# Patient Record
Sex: FEMALE | Race: BLACK OR AFRICAN AMERICAN | NOT HISPANIC OR LATINO | Employment: PART TIME | URBAN - NONMETROPOLITAN AREA
[De-identification: names, ages, dates, MRNs, and addresses within clinical notes are randomized per-mention and may not be internally consistent; named-entity substitution may affect disease eponyms.]

---

## 2023-07-11 LAB — CRC RECOMMENDATION EXT: NORMAL

## 2023-10-13 ENCOUNTER — OFFICE VISIT (OUTPATIENT)
Dept: FAMILY MEDICINE | Facility: CLINIC | Age: 48
End: 2023-10-13
Payer: COMMERCIAL

## 2023-10-13 VITALS
TEMPERATURE: 98 F | SYSTOLIC BLOOD PRESSURE: 139 MMHG | BODY MASS INDEX: 36.54 KG/M2 | OXYGEN SATURATION: 95 % | WEIGHT: 214 LBS | HEART RATE: 84 BPM | HEIGHT: 64 IN | DIASTOLIC BLOOD PRESSURE: 90 MMHG

## 2023-10-13 DIAGNOSIS — F32.A ANXIETY AND DEPRESSION: ICD-10-CM

## 2023-10-13 DIAGNOSIS — D50.8 IRON DEFICIENCY ANEMIA SECONDARY TO INADEQUATE DIETARY IRON INTAKE: ICD-10-CM

## 2023-10-13 DIAGNOSIS — F31.32 BIPOLAR AFFECTIVE DISORDER, CURRENTLY DEPRESSED, MODERATE: Chronic | ICD-10-CM

## 2023-10-13 DIAGNOSIS — G89.29 CHRONIC LEFT-SIDED LOW BACK PAIN WITH LEFT-SIDED SCIATICA: ICD-10-CM

## 2023-10-13 DIAGNOSIS — Z13.1 SCREENING FOR DIABETES MELLITUS (DM): ICD-10-CM

## 2023-10-13 DIAGNOSIS — J01.00 SUBACUTE MAXILLARY SINUSITIS: ICD-10-CM

## 2023-10-13 DIAGNOSIS — M54.42 CHRONIC LEFT-SIDED LOW BACK PAIN WITH LEFT-SIDED SCIATICA: ICD-10-CM

## 2023-10-13 DIAGNOSIS — L02.439 CARBUNCLE OF AXILLA: ICD-10-CM

## 2023-10-13 DIAGNOSIS — I10 PRIMARY HYPERTENSION: ICD-10-CM

## 2023-10-13 DIAGNOSIS — F41.9 ANXIETY AND DEPRESSION: ICD-10-CM

## 2023-10-13 DIAGNOSIS — E55.9 VITAMIN D DEFICIENCY: ICD-10-CM

## 2023-10-13 DIAGNOSIS — Z11.59 ENCOUNTER FOR HEPATITIS C SCREENING TEST FOR LOW RISK PATIENT: ICD-10-CM

## 2023-10-13 DIAGNOSIS — E78.2 MIXED HYPERLIPIDEMIA: Primary | ICD-10-CM

## 2023-10-13 DIAGNOSIS — M47.26 OSTEOARTHRITIS OF SPINE WITH RADICULOPATHY, LUMBAR REGION: ICD-10-CM

## 2023-10-13 PROBLEM — E78.5 HYPERLIPIDEMIA: Status: ACTIVE | Noted: 2023-10-13

## 2023-10-13 LAB
25(OH)D3 SERPL-MCNC: 82.5 NG/ML
ALBUMIN SERPL BCP-MCNC: 3.5 G/DL (ref 3.5–5)
ALBUMIN/GLOB SERPL: 0.9 {RATIO}
ALP SERPL-CCNC: 56 U/L (ref 41–108)
ALT SERPL W P-5'-P-CCNC: 18 U/L (ref 13–56)
ANION GAP SERPL CALCULATED.3IONS-SCNC: 11 MMOL/L (ref 7–16)
AST SERPL W P-5'-P-CCNC: 13 U/L (ref 15–37)
BASOPHILS # BLD AUTO: 0.14 K/UL (ref 0–0.2)
BASOPHILS NFR BLD AUTO: 1.9 % (ref 0–1)
BILIRUB SERPL-MCNC: 0.2 MG/DL (ref ?–1.2)
BILIRUB SERPL-MCNC: NEGATIVE MG/DL
BLOOD URINE, POC: ABNORMAL
BUN SERPL-MCNC: 7 MG/DL (ref 7–18)
BUN/CREAT SERPL: 8 (ref 6–20)
CALCIUM SERPL-MCNC: 9.1 MG/DL (ref 8.5–10.1)
CHLORIDE SERPL-SCNC: 111 MMOL/L (ref 98–107)
CLARITY, POC UA: CLEAR
CO2 SERPL-SCNC: 23 MMOL/L (ref 21–32)
COLOR, POC UA: YELLOW
CREAT SERPL-MCNC: 0.85 MG/DL (ref 0.55–1.02)
CREAT UR-MCNC: 293 MG/DL (ref 28–219)
DIFFERENTIAL METHOD BLD: ABNORMAL
EGFR (NO RACE VARIABLE) (RUSH/TITUS): 84 ML/MIN/1.73M2
EOSINOPHIL # BLD AUTO: 0.01 K/UL (ref 0–0.5)
EOSINOPHIL NFR BLD AUTO: 0.1 % (ref 1–4)
ERYTHROCYTE [DISTWIDTH] IN BLOOD BY AUTOMATED COUNT: 16 % (ref 11.5–14.5)
EST. AVERAGE GLUCOSE BLD GHB EST-MCNC: 107 MG/DL
GLOBULIN SER-MCNC: 4 G/DL (ref 2–4)
GLUCOSE SERPL-MCNC: 67 MG/DL (ref 74–106)
GLUCOSE UR QL STRIP: NEGATIVE
HBA1C MFR BLD HPLC: 5.8 % (ref 4.5–6.6)
HCT VFR BLD AUTO: 42.3 % (ref 38–47)
HCV AB SER QL: NORMAL
HGB BLD-MCNC: 13.3 G/DL (ref 12–16)
IMM GRANULOCYTES # BLD AUTO: 0.03 K/UL (ref 0–0.04)
IMM GRANULOCYTES NFR BLD: 0.4 % (ref 0–0.4)
IRON SATN MFR SERPL: 9 % (ref 14–50)
IRON SERPL-MCNC: 35 ΜG/DL (ref 50–170)
KETONES UR QL STRIP: NEGATIVE
LEUKOCYTE ESTERASE URINE, POC: NEGATIVE
LYMPHOCYTES # BLD AUTO: 1.51 K/UL (ref 1–4.8)
LYMPHOCYTES NFR BLD AUTO: 20.3 % (ref 27–41)
MCH RBC QN AUTO: 28.4 PG (ref 27–31)
MCHC RBC AUTO-ENTMCNC: 31.4 G/DL (ref 32–36)
MCV RBC AUTO: 90.4 FL (ref 80–96)
MICROALBUMIN UR-MCNC: 1.8 MG/DL (ref 0–2.8)
MICROALBUMIN/CREAT RATIO PNL UR: 6.1 MG/G (ref 0–30)
MONOCYTES # BLD AUTO: 0.41 K/UL (ref 0–0.8)
MONOCYTES NFR BLD AUTO: 5.5 % (ref 2–6)
MPC BLD CALC-MCNC: 11.3 FL (ref 9.4–12.4)
NEUTROPHILS # BLD AUTO: 5.35 K/UL (ref 1.8–7.7)
NEUTROPHILS NFR BLD AUTO: 71.8 % (ref 53–65)
NITRITE, POC UA: NEGATIVE
NRBC # BLD AUTO: 0 X10E3/UL
NRBC, AUTO (.00): 0 %
PH, POC UA: 5.5
PLATELET # BLD AUTO: 463 K/UL (ref 150–400)
POTASSIUM SERPL-SCNC: 3 MMOL/L (ref 3.5–5.1)
PROT SERPL-MCNC: 7.5 G/DL (ref 6.4–8.2)
PROTEIN, POC: NEGATIVE
RBC # BLD AUTO: 4.68 M/UL (ref 4.2–5.4)
SODIUM SERPL-SCNC: 142 MMOL/L (ref 136–145)
SPECIFIC GRAVITY, POC UA: 1.02
T4 FREE SERPL-MCNC: 1.18 NG/DL (ref 0.76–1.46)
TIBC SERPL-MCNC: 405 ΜG/DL (ref 250–450)
TSH SERPL DL<=0.005 MIU/L-ACNC: 0.84 UIU/ML (ref 0.36–3.74)
UROBILINOGEN, POC UA: 0.2
WBC # BLD AUTO: 7.45 K/UL (ref 4.5–11)

## 2023-10-13 PROCEDURE — 80053 COMPREHEN METABOLIC PANEL: CPT | Mod: ,,, | Performed by: CLINICAL MEDICAL LABORATORY

## 2023-10-13 PROCEDURE — 82570 ASSAY OF URINE CREATININE: CPT | Mod: ,,, | Performed by: CLINICAL MEDICAL LABORATORY

## 2023-10-13 PROCEDURE — 85025 COMPLETE CBC W/AUTO DIFF WBC: CPT | Mod: ,,, | Performed by: CLINICAL MEDICAL LABORATORY

## 2023-10-13 PROCEDURE — 82043 MICROALBUMIN / CREATININE RATIO URINE: ICD-10-PCS | Mod: ,,, | Performed by: CLINICAL MEDICAL LABORATORY

## 2023-10-13 PROCEDURE — 4010F PR ACE/ARB THEARPY RXD/TAKEN: ICD-10-PCS | Mod: ,,, | Performed by: NURSE PRACTITIONER

## 2023-10-13 PROCEDURE — 84443 TSH: ICD-10-PCS | Mod: ,,, | Performed by: CLINICAL MEDICAL LABORATORY

## 2023-10-13 PROCEDURE — G0008 ADMIN INFLUENZA VIRUS VAC: HCPCS | Mod: ,,, | Performed by: NURSE PRACTITIONER

## 2023-10-13 PROCEDURE — 86803 HEPATITIS C ANTIBODY: ICD-10-PCS | Mod: ,,, | Performed by: CLINICAL MEDICAL LABORATORY

## 2023-10-13 PROCEDURE — 85025 CBC WITH DIFFERENTIAL: ICD-10-PCS | Mod: ,,, | Performed by: CLINICAL MEDICAL LABORATORY

## 2023-10-13 PROCEDURE — 81002 POCT URINE DIPSTICK WITHOUT MICROSCOPE: ICD-10-PCS | Mod: ,,, | Performed by: NURSE PRACTITIONER

## 2023-10-13 PROCEDURE — 83550 IRON BINDING TEST: CPT | Mod: ,,, | Performed by: CLINICAL MEDICAL LABORATORY

## 2023-10-13 PROCEDURE — 84439 ASSAY OF FREE THYROXINE: CPT | Mod: ,,, | Performed by: CLINICAL MEDICAL LABORATORY

## 2023-10-13 PROCEDURE — 99204 OFFICE O/P NEW MOD 45 MIN: CPT | Mod: ,,, | Performed by: NURSE PRACTITIONER

## 2023-10-13 PROCEDURE — 99204 PR OFFICE/OUTPT VISIT, NEW, LEVL IV, 45-59 MIN: ICD-10-PCS | Mod: ,,, | Performed by: NURSE PRACTITIONER

## 2023-10-13 PROCEDURE — 4010F ACE/ARB THERAPY RXD/TAKEN: CPT | Mod: ,,, | Performed by: NURSE PRACTITIONER

## 2023-10-13 PROCEDURE — 82306 VITAMIN D 25 HYDROXY: CPT | Mod: ,,, | Performed by: CLINICAL MEDICAL LABORATORY

## 2023-10-13 PROCEDURE — 3008F BODY MASS INDEX DOCD: CPT | Mod: ,,, | Performed by: NURSE PRACTITIONER

## 2023-10-13 PROCEDURE — 82043 UR ALBUMIN QUANTITATIVE: CPT | Mod: ,,, | Performed by: CLINICAL MEDICAL LABORATORY

## 2023-10-13 PROCEDURE — 84439 T4, FREE: ICD-10-PCS | Mod: ,,, | Performed by: CLINICAL MEDICAL LABORATORY

## 2023-10-13 PROCEDURE — 90686 IIV4 VACC NO PRSV 0.5 ML IM: CPT | Mod: ,,, | Performed by: NURSE PRACTITIONER

## 2023-10-13 PROCEDURE — 83036 HEMOGLOBIN A1C: ICD-10-PCS | Mod: GZ,,, | Performed by: CLINICAL MEDICAL LABORATORY

## 2023-10-13 PROCEDURE — 3075F SYST BP GE 130 - 139MM HG: CPT | Mod: ,,, | Performed by: NURSE PRACTITIONER

## 2023-10-13 PROCEDURE — 82570 MICROALBUMIN / CREATININE RATIO URINE: ICD-10-PCS | Mod: ,,, | Performed by: CLINICAL MEDICAL LABORATORY

## 2023-10-13 PROCEDURE — 83540 IRON AND TIBC: ICD-10-PCS | Mod: ,,, | Performed by: CLINICAL MEDICAL LABORATORY

## 2023-10-13 PROCEDURE — 82306 VITAMIN D: ICD-10-PCS | Mod: ,,, | Performed by: CLINICAL MEDICAL LABORATORY

## 2023-10-13 PROCEDURE — 86803 HEPATITIS C AB TEST: CPT | Mod: ,,, | Performed by: CLINICAL MEDICAL LABORATORY

## 2023-10-13 PROCEDURE — 3080F DIAST BP >= 90 MM HG: CPT | Mod: ,,, | Performed by: NURSE PRACTITIONER

## 2023-10-13 PROCEDURE — 3008F PR BODY MASS INDEX (BMI) DOCUMENTED: ICD-10-PCS | Mod: ,,, | Performed by: NURSE PRACTITIONER

## 2023-10-13 PROCEDURE — 1159F PR MEDICATION LIST DOCUMENTED IN MEDICAL RECORD: ICD-10-PCS | Mod: ,,, | Performed by: NURSE PRACTITIONER

## 2023-10-13 PROCEDURE — 90686 FLU VACCINE (QUAD) GREATER THAN OR EQUAL TO 3YO PRESERVATIVE FREE IM: ICD-10-PCS | Mod: ,,, | Performed by: NURSE PRACTITIONER

## 2023-10-13 PROCEDURE — G0008 FLU VACCINE (QUAD) GREATER THAN OR EQUAL TO 3YO PRESERVATIVE FREE IM: ICD-10-PCS | Mod: ,,, | Performed by: NURSE PRACTITIONER

## 2023-10-13 PROCEDURE — 83550 IRON AND TIBC: ICD-10-PCS | Mod: ,,, | Performed by: CLINICAL MEDICAL LABORATORY

## 2023-10-13 PROCEDURE — 80053 COMPREHENSIVE METABOLIC PANEL: ICD-10-PCS | Mod: ,,, | Performed by: CLINICAL MEDICAL LABORATORY

## 2023-10-13 PROCEDURE — 83540 ASSAY OF IRON: CPT | Mod: ,,, | Performed by: CLINICAL MEDICAL LABORATORY

## 2023-10-13 PROCEDURE — 3080F PR MOST RECENT DIASTOLIC BLOOD PRESSURE >= 90 MM HG: ICD-10-PCS | Mod: ,,, | Performed by: NURSE PRACTITIONER

## 2023-10-13 PROCEDURE — 83036 HEMOGLOBIN GLYCOSYLATED A1C: CPT | Mod: GZ,,, | Performed by: CLINICAL MEDICAL LABORATORY

## 2023-10-13 PROCEDURE — 3075F PR MOST RECENT SYSTOLIC BLOOD PRESS GE 130-139MM HG: ICD-10-PCS | Mod: ,,, | Performed by: NURSE PRACTITIONER

## 2023-10-13 PROCEDURE — 84443 ASSAY THYROID STIM HORMONE: CPT | Mod: ,,, | Performed by: CLINICAL MEDICAL LABORATORY

## 2023-10-13 PROCEDURE — 81002 URINALYSIS NONAUTO W/O SCOPE: CPT | Mod: ,,, | Performed by: NURSE PRACTITIONER

## 2023-10-13 PROCEDURE — 1159F MED LIST DOCD IN RCRD: CPT | Mod: ,,, | Performed by: NURSE PRACTITIONER

## 2023-10-13 RX ORDER — ATORVASTATIN CALCIUM 10 MG/1
10 TABLET, FILM COATED ORAL NIGHTLY
COMMUNITY
Start: 2023-08-23 | End: 2024-01-17 | Stop reason: SDUPTHER

## 2023-10-13 RX ORDER — BUSPIRONE HYDROCHLORIDE 10 MG/1
10 TABLET ORAL 3 TIMES DAILY PRN
Qty: 90 TABLET | Refills: 0 | Status: SHIPPED | OUTPATIENT
Start: 2023-10-13 | End: 2024-01-18 | Stop reason: SINTOL

## 2023-10-13 RX ORDER — BUSPIRONE HYDROCHLORIDE 10 MG/1
10 TABLET ORAL DAILY
COMMUNITY
Start: 2023-02-16 | End: 2023-10-13 | Stop reason: SDUPTHER

## 2023-10-13 RX ORDER — SULFAMETHOXAZOLE AND TRIMETHOPRIM 800; 160 MG/1; MG/1
1 TABLET ORAL 2 TIMES DAILY
Qty: 20 TABLET | Refills: 0 | Status: SHIPPED | OUTPATIENT
Start: 2023-10-13 | End: 2023-10-23

## 2023-10-13 RX ORDER — ERGOCALCIFEROL 1.25 MG/1
50000 CAPSULE ORAL
COMMUNITY
Start: 2023-09-15 | End: 2024-01-18 | Stop reason: SDUPTHER

## 2023-10-13 RX ORDER — CLINDAMYCIN HYDROCHLORIDE 300 MG/1
300 CAPSULE ORAL EVERY 8 HOURS
Qty: 30 CAPSULE | Refills: 0 | Status: SHIPPED | OUTPATIENT
Start: 2023-10-13 | End: 2023-10-23

## 2023-10-13 RX ORDER — MUPIROCIN 20 MG/G
OINTMENT TOPICAL 3 TIMES DAILY
Qty: 22 G | Refills: 0 | Status: SHIPPED | OUTPATIENT
Start: 2023-10-13 | End: 2024-01-18 | Stop reason: ALTCHOICE

## 2023-10-13 RX ORDER — MELOXICAM 7.5 MG/1
7.5 TABLET ORAL ONCE AS NEEDED
COMMUNITY
Start: 2023-05-26 | End: 2024-01-18 | Stop reason: HOSPADM

## 2023-10-13 RX ORDER — QUETIAPINE FUMARATE 50 MG/1
50 TABLET, EXTENDED RELEASE ORAL NIGHTLY
Qty: 60 TABLET | Refills: 0 | Status: SHIPPED | OUTPATIENT
Start: 2023-10-13 | End: 2024-01-18 | Stop reason: SINTOL

## 2023-10-13 RX ORDER — FERROUS SULFATE 325(65) MG
325 TABLET ORAL 3 TIMES DAILY
COMMUNITY
Start: 2023-09-06 | End: 2024-01-17 | Stop reason: SDUPTHER

## 2023-10-13 RX ORDER — BACLOFEN 10 MG/1
10 TABLET ORAL 2 TIMES DAILY PRN
COMMUNITY
Start: 2023-06-15 | End: 2024-01-18

## 2023-10-13 RX ORDER — AMLODIPINE AND VALSARTAN 10; 320 MG/1; MG/1
1 TABLET ORAL DAILY
COMMUNITY
Start: 2023-08-23 | End: 2023-11-30 | Stop reason: SDUPTHER

## 2023-10-13 RX ORDER — IBUPROFEN 200 MG
200 TABLET ORAL EVERY 6 HOURS
COMMUNITY
Start: 2023-09-06 | End: 2024-01-18

## 2023-10-13 NOTE — ASSESSMENT & PLAN NOTE
Chronic problem  Continue atorvastatin 10 mg daily  Low cholesterol diet recommended  Increase physical activity daily and reduce inflammatory foods from diet as discussed at length in clinic  Fasting lab today

## 2023-10-13 NOTE — PATIENT INSTRUCTIONS
Begin Seroquel R 50 mg at night for one night then increase to two pills at night  Return for recheck in 3 weeks  Goal from medication: decrease anxiety, improve mood, increase desire to perform daily activities, improve sleep, reduce crying/tearful mood  Resume Buspirone 10 mg three times daily as needed for anxiety/anxious mood  Begin walking briskly for 5 minutes each day with goal up to 30 minutes per day  Increase water intake with goal of 1/2 body weight in ounces of water each day  Reduce/change Mountain Dew to Zero Sugar Mountain Dew and limit to one per day  Change diet to add color from vegetables and fruit/ nuts/ cheese/ healthy/lean means like chicken/fish/seafood/egg whites    For arm: warm salt water soak 5 minutes three round twice a day  Avoid squeezing or mashing area  Begin Bactrim DS twice a day for 10 days  Begin Clindamycin 300 mg three times daily for 10 days  Mupirocin ointment three times daily until healed    For sinus/ear pain/headache: Begin oral antibiotics  Begin Sudafed PE four times daily for 5 days   Add Mucinex 600 mg four times daily for 5 days  Blow nose often  Hold nose and blow then hold nose and swallow-- repeat three times four times daily to help move secretions from ear and reduce headache

## 2023-10-13 NOTE — PROGRESS NOTES
MARIMAR Diaz    [address] [place] [address]     PATIENT NAME: Yokasta Segal  : 1975  DATE: 10/13/23  MRN: [id number]      Billing Provider: MARIMAR Diaz  Level of Service: IA OFFICE/OUTPT VISIT, SVETLANA SANDERS IV, 45-59 MIN    ASSESSMENT AND PLAN:      Problem List Items Addressed This Visit          Neuro    Osteoarthritis of spine with radiculopathy, lumbar region       Psychiatric    Bipolar affective disorder, currently depressed, moderate (Chronic)    Current Assessment & Plan     Recurrent chronic acute problem  Resume Seroquel XR 50 mg tonight then increase to two pills nightly  Resume Buspirone 10 mg TID PRN anxiety           Relevant Medications    QUEtiapine (SEROQUEL XR) 50 mg Tb24    Other Relevant Orders    TSH    T4, Free       Cardiac/Vascular    Hyperlipidemia - Primary    Current Assessment & Plan     Chronic problem  Continue atorvastatin 10 mg daily  Low cholesterol diet recommended  Increase physical activity daily and reduce inflammatory foods from diet as discussed at length in clinic  Fasting lab today           Relevant Orders    Lipid Panel    Comprehensive Metabolic Panel    Primary hypertension    Current Assessment & Plan     Chronic problem  New provider today  Continue amlodipine-valsartan 10/320 mg daily  Low salt diet  Recommend reduce weight by 10 lbs to see change and lower blood pressure           Relevant Orders    CBC Auto Differential    Comprehensive Metabolic Panel    POCT urine dipstick without microscope (Completed)    Microalbumin/Creatinine Ratio, Urine       Oncology    Iron deficiency anemia secondary to inadequate dietary iron intake    Current Assessment & Plan     Chronic problem  Continue supplement  Recheck lab today  High rich iron diet recommended         Relevant Orders    Iron and TIBC       Endocrine    Vitamin D deficiency    Current Assessment & Plan     Chronic problem  Continue  supplement/replacement  Recheck lab results today with goal greater than 30         Relevant Orders    Vitamin D       Orthopedic    Chronic left-sided low back pain with left-sided sciatica    Overview     Dr. Min Dominguez at [facility]         Current Assessment & Plan     Chronic problem managed by Dr. Min Dominguez            Other Visit Diagnoses       Encounter for hepatitis C screening test for low risk patient        Relevant Orders    Hepatitis C Antibody    Screening for diabetes mellitus (DM)        Relevant Orders    Hemoglobin A1C    Anxiety and depression        Relevant Medications    busPIRone (BUSPAR) 10 MG tablet    Carbuncle of axilla        Relevant Medications    sulfamethoxazole-trimethoprim 800-160mg (BACTRIM DS) 800-160 mg Tab    clindamycin (CLEOCIN) 300 MG capsule    mupirocin (BACTROBAN) 2 % ointment    Subacute maxillary sinusitis        Relevant Medications    chlorpheniramine-phenylephrine 4-10 mg per tablet    sulfamethoxazole-trimethoprim 800-160mg (BACTRIM DS) 800-160 mg Tab            Health Maintenance Topics with due status: Not Due       Topic Last Completion Date    Mammogram 07/05/2023     Future Appointments   Date Time Provider Department Center   11/7/2023  8:00 AM Tatyana Lo, [facility]ULI[facility] Delacruz      Follow up in about 3 weeks (around 11/3/2023). or sooner as needed.      CHIEF COMPLAINT: Establish Care (Patient here to establish care), Anxiety (Patient state she has been dealing with a lot of stress and anxiety. She state she has alot going on that she will like to  discuss. She state she was on Wellbutrin and Seroquel. ), Depression (Think she's on Buspar but not quiet sure if that's the one she one. ), Headache (Patient state she having head pain for about 2 months. She was told it was bad sinus state she complete a round of amoxicillin and it left but came back. Describe it as somebody sticking a needle in her head. ), Cyst (Patient state she has  a cyst on left arm. Was prescribed bactrim and completed those and it's still there. ), Labs Only (She state he mother and father has cancer & she would like to be checked for cancer.), Health Maintenance (Will like to have a pap smear done. ), and Hypertension (Did not take BP medication this morning. )           HISTORY OF PRESENT ILLNESS:  Yokasta Segal is a 48 y.o. female who presents to the clinic today     Yokasta Segal presents to the clinic with Establish Care (Patient here to establish care), Anxiety (Patient state she has been dealing with a lot of stress and anxiety. She state she has alot going on that she will like to  discuss. She state she was on Wellbutrin and Seroquel. ), Depression (Think she's on Buspar but not quiet sure if that's the one she one. ), Headache (Patient state she having head pain for about 2 months. She was told it was bad sinus state she complete a round of amoxicillin and it left but came back. Describe it as somebody sticking a needle in her head. ), Cyst (Patient state she has a cyst on left arm. Was prescribed bactrim and completed those and it's still there. ), Labs Only (She state he mother and father has cancer & she would like to be checked for cancer.), Health Maintenance (Will like to have a pap smear done. ), and Hypertension (Did not take BP medication this morning. )     Anxiety  Presents for initial visit. Onset was 1 to 6 months ago. The problem has been gradually worsening. Symptoms include decreased concentration, depressed mood, dizziness, excessive worry, insomnia, irritability, malaise, muscle tension, nervous/anxious behavior and obsessions. Patient reports no suicidal ideas. Symptoms occur constantly. The severity of symptoms is causing significant distress. The symptoms are aggravated by family issues, medication and medication withdrawal. The patient sleeps 0 hours per night. The quality of sleep is non-restorative (reports retired for evening at 7:30 and  sleeps til 2 am then awake). Nighttime awakenings: then reports not sleeping but lying in bed awake.     Risk factors include a major life event and change in medication. Her past medical history is significant for anemia, bipolar disorder and depression. Past treatments include lifestyle changes, non-benzodiazephine anxiolytics, non-SSRI antidepressants and counseling (CBT). The treatment provided moderate relief. Compliance with prior treatments has been variable. Prior compliance problems include medication issues.   Depression  Visit Type: initial  Onset of symptoms: more than 5 years ago  Progression since onset: gradually worsening  Patient presents with the following symptoms: anhedonia, decreased concentration, depressed mood, excessive worry, fatigue, feelings of hopelessness, hypersomnia, insomnia, irritability, malaise, muscle tension, nervousness/anxiety, obsessions and thoughts of death.  Patient is not experiencing: suicidal ideas and suicidal planning.  Frequency of symptoms: constantly   Severity: causing significant distress   Aggravated by: family issues, medication withdrawal and caffeine  Sleep per night: 7 hours  Sleep quality: non-restorative  Nighttime awakenings: awakes around 2 am and awake the remainder of night.  Risk factors: previous episode of depression and change in medication  Patient has a history of: anemia, bipolar disorder and depression  Treatment tried: counseling (CBT), non-SSRI antidepressants, non-benzodiazephine anxiolytics and medications  Compliance with treatment: variable  Improvement on treatment: moderate    Headache   This is a recurrent problem. The current episode started in the past 7 days. The problem occurs intermittently. The problem has been waxing and waning. The pain is located in the Left unilateral region. The pain does not radiate. The pain quality is not similar to prior headaches. The quality of the pain is described as sharp, dull and aching. The pain  is at a severity of 5/10. The pain is moderate. Associated symptoms include dizziness, ear pain, insomnia, scalp tenderness and sinus pressure. The symptoms are aggravated by chewing and weather changes. She has tried acetaminophen and NSAIDs for the symptoms. The treatment provided no relief. Her past medical history is significant for hypertension, migraine headaches, obesity and sinus disease.   Cyst  This is a recurrent problem. The current episode started more than 1 month ago. The problem occurs rarely. The problem has been gradually improving. Associated symptoms include headaches. Nothing aggravates the symptoms. Treatments tried: took bactrim DS about three weeks ago but did not resolve.   Hypertension  This is a chronic problem. The current episode started more than 1 year ago. The problem has been waxing and waning since onset. The problem is controlled. Associated symptoms include anxiety, headaches and malaise/fatigue. There are no associated agents to hypertension. Risk factors for coronary artery disease include family history, obesity, sedentary lifestyle and stress. Past treatments include angiotensin blockers and calcium channel blockers. The current treatment provides significant improvement. Compliance problems include exercise and diet.        PAST MEDICAL HISTORY:      Past Medical History:   Diagnosis Date    Anxiety     Arthritis     Depression     Hyperlipidemia     Hypertension      PAST SURGICAL HISTORY:  Past Surgical History:   Procedure Laterality Date    Left Knee Surgery Left     TUBAL LIGATION       SOCIAL HISTORY:  Social History     Socioeconomic History    Marital status: Single   Tobacco Use    Smoking status: Never    Smokeless tobacco: Never   Substance and Sexual Activity    Alcohol use: Never    Drug use: Never    Sexual activity: Yes     Partners: Male     FAMILY HISTORY:       Family History   Problem Relation Age of Onset    Cancer Mother     Gout Mother     Thyroid  disease Mother     Cancer Father        ALLERGIES AND MEDICATIONS: updated and reviewed.       Review of patient's allergies indicates:  No Known Allergies  Medication List with Changes/Refills   New Medications    CHLORPHENIRAMINE-PHENYLEPHRINE 4-10 MG PER TABLET    Take 1 tablet by mouth every 4 (four) hours as needed for Congestion.    CLINDAMYCIN (CLEOCIN) 300 MG CAPSULE    Take 1 capsule (300 mg total) by mouth every 8 (eight) hours. for 10 days    MUPIROCIN (BACTROBAN) 2 % OINTMENT    Apply topically 3 (three) times daily.    QUETIAPINE (SEROQUEL XR) 50 MG TB24    Take 1 tablet (50 mg total) by mouth every evening. For one night then increase to two pills at bedtime    SULFAMETHOXAZOLE-TRIMETHOPRIM 800-160MG (BACTRIM DS) 800-160 MG TAB    Take 1 tablet by mouth 2 (two) times daily. for 10 days   Current Medications    AMLODIPINE-VALSARTAN (EXFORGE)  MG PER TABLET    Take 1 tablet by mouth once daily.    ATORVASTATIN (LIPITOR) 10 MG TABLET    Take 10 mg by mouth every evening.    BACLOFEN (LIORESAL) 10 MG TABLET    Take 10 mg by mouth 2 (two) times daily as needed.    ERGOCALCIFEROL (ERGOCALCIFEROL) 50,000 UNIT CAP    Take 50,000 Units by mouth every 7 days.    FERROUS SULFATE (FEOSOL) 325 MG (65 MG IRON) TAB TABLET    Take 325 mg by mouth 3 (three) times daily.    IBUPROFEN (ADVIL,MOTRIN) 200 MG TABLET    Take 200 mg by mouth every 6 (six) hours.    MELOXICAM (MOBIC) 7.5 MG TABLET    Take 7.5 mg by mouth once as needed.   Changed and/or Refilled Medications    Modified Medication Previous Medication    BUSPIRONE (BUSPAR) 10 MG TABLET busPIRone (BUSPAR) 10 MG tablet       Take 1 tablet (10 mg total) by mouth 3 (three) times daily as needed (anxiety).    Take 10 mg by mouth once daily.       SCREENING HISTORY:     Health Maintenance         Date Due Completion Date    Hepatitis C Screening Never done ---    Cervical Cancer Screening Never done ---    Lipid Panel Never done ---    COVID-19 Vaccine (1) Never  "done ---    Hemoglobin A1c (Diabetic Prevention Screening) Never done ---    Colorectal Cancer Screening Never done ---    Shingles Vaccine (1 of 2) Never done ---    HIV Screening 10/15/2023 (Originally 4/17/1990) ---    TETANUS VACCINE 10/15/2024 (Originally 4/17/1991) ---    Mammogram 07/05/2024 7/5/2023            REVIEW OF SYSTEMS:   Review of Systems   Constitutional:  Positive for irritability and malaise/fatigue.   HENT:  Positive for ear pain and sinus pressure/congestion.    Neurological:  Positive for dizziness and headaches.   Psychiatric/Behavioral:  Positive for decreased concentration and depression. Negative for suicidal ideas. The patient is nervous/anxious and has insomnia.          PHYSICAL EXAM:         BP (!) 139/90 (BP Location: Left arm, Patient Position: Sitting)   Pulse 84   Temp 98.2 °F (36.8 °C) (Oral)   Ht 5' 3.5" (1.613 m)   Wt 97.1 kg (214 lb)   LMP 10/08/2023 (Approximate)   SpO2 95%   BMI 37.31 kg/m²  Patient's last menstrual period was 10/08/2023 (approximate).  Wt Readings from Last 3 Encounters:   10/13/23 97.1 kg (214 lb)     BP Readings from Last 3 Encounters:   10/13/23 (!) 139/90     Estimated body mass index is 37.31 kg/m² as calculated from the following:    Height as of this encounter: 5' 3.5" (1.613 m).    Weight as of this encounter: 97.1 kg (214 lb).     Physical Exam  Constitutional:       Appearance: She is obese.   HENT:      Head: Normocephalic.      Right Ear: A middle ear effusion is present.      Left Ear: A middle ear effusion is present. Tympanic membrane is retracted and bulging.      Nose: Mucosal edema present.      Right Turbinates: Not swollen.      Left Turbinates: Swollen.      Left Sinus: Maxillary sinus tenderness present.      Mouth/Throat:      Lips: Pink.      Pharynx: Oropharynx is clear.   Cardiovascular:      Rate and Rhythm: Normal rate and regular rhythm.      Pulses: Normal pulses.   Pulmonary:      Effort: Pulmonary effort is normal.     " " Breath sounds: Normal breath sounds.   Abdominal:      General: Bowel sounds are normal.      Palpations: Abdomen is soft.   Musculoskeletal:      Cervical back: Neck supple.   Skin:     General: Skin is warm.   Neurological:      Mental Status: She is alert and oriented to person, place, and time.   Psychiatric:         Mood and Affect: Mood is depressed. Affect is labile, blunt, flat and tearful.         Speech: Speech normal.         Behavior: Behavior is cooperative.         Thought Content: Thought content is paranoid.         Cognition and Memory: Cognition normal.         LABS:     I have reviewed old labs below:  No results found for: "WBC", "HGB", "HCT", "MCV", "PLT", "NA", "K", "CL", "CALCIUM", "PHOS", "CO2", "GLU", "BUN", "CREATININE", "ANIONGAP", "ESTGFRAFRICA", "EGFRNONAA", "PROT", "ALBUMIN", "BILITOT", "ALKPHOS", "ALT", "AST", "INR", "CHOL", "TRIG", "HDL", "LDLCALC", "TSH", "PSA", "GLUF", "HGBA1C", "MICROALBUR"        Signature:  Tatyana Lo [address] [place] [address]  Phone #: [phone]  Fax #: [phone]       Date of encounter: 10/13/23    Patient Instructions   Begin Seroquel R 50 mg at night for one night then increase to two pills at night  Return for recheck in 3 weeks  Goal from medication: decrease anxiety, improve mood, increase desire to perform daily activities, improve sleep, reduce crying/tearful mood  Resume Buspirone 10 mg three times daily as needed for anxiety/anxious mood  Begin walking briskly for 5 minutes each day with goal up to 30 minutes per day  Increase water intake with goal of 1/2 body weight in ounces of water each day  Reduce/change Mountain Dew to Zero Sugar Mountain Dew and limit to one per day  Change diet to add color from vegetables and fruit/ nuts/ cheese/ healthy/lean means like chicken/fish/seafood/egg whites    For arm: warm salt water soak 5 minutes three round twice a day  Avoid squeezing or " mashing area  Begin Bactrim DS twice a day for 10 days  Begin Clindamycin 300 mg three times daily for 10 days  Mupirocin ointment three times daily until healed    For sinus/ear pain/headache: Begin oral antibiotics  Begin Sudafed PE four times daily for 5 days   Add Mucinex 600 mg four times daily for 5 days  Blow nose often  Hold nose and blow then hold nose and swallow-- repeat three times four times daily to help move secretions from ear and reduce headache

## 2023-10-13 NOTE — ASSESSMENT & PLAN NOTE
Recurrent chronic acute problem  Resume Seroquel XR 50 mg tonight then increase to two pills nightly  Resume Buspirone 10 mg TID PRN anxiety

## 2023-10-13 NOTE — ASSESSMENT & PLAN NOTE
Chronic problem  New provider today  Continue amlodipine-valsartan 10/320 mg daily  Low salt diet  Recommend reduce weight by 10 lbs to see change and lower blood pressure

## 2023-10-13 NOTE — ASSESSMENT & PLAN NOTE
Chronic problem  Continue supplement/replacement  Recheck lab results today with goal greater than 30

## 2023-10-14 DIAGNOSIS — E87.6 HYPOKALEMIA DUE TO EXCESSIVE RENAL LOSS OF POTASSIUM: Primary | ICD-10-CM

## 2023-10-14 DIAGNOSIS — E87.6 HYPOKALEMIA DUE TO EXCESSIVE RENAL LOSS OF POTASSIUM: ICD-10-CM

## 2023-10-14 RX ORDER — POTASSIUM CHLORIDE 750 MG/1
10 CAPSULE, EXTENDED RELEASE ORAL DAILY
Qty: 90 CAPSULE | Refills: 0 | Status: SHIPPED | OUTPATIENT
Start: 2023-10-14 | End: 2024-01-17 | Stop reason: SDUPTHER

## 2023-10-14 RX ORDER — POTASSIUM CHLORIDE 750 MG/1
10 CAPSULE, EXTENDED RELEASE ORAL DAILY
Qty: 90 CAPSULE | Refills: 0 | Status: SHIPPED | OUTPATIENT
Start: 2023-10-14 | End: 2023-10-14 | Stop reason: SDUPTHER

## 2023-10-14 NOTE — PROGRESS NOTES
Pt requested potassium be sent to [facility] in [place] due to [facility] closed over weekend.  Medication sent per request.

## 2023-10-14 NOTE — PROGRESS NOTES
Potassium 3.0 with elevated urine creatinine: replace potassium 10 mEq daily  Return in 3 weeks with recheck on potassium at that time/ monitor and evaluate blood pressure also and recheck mental/emotional wellbeing.

## 2023-11-09 DIAGNOSIS — Z71.89 COMPLEX CARE COORDINATION: ICD-10-CM

## 2023-11-30 DIAGNOSIS — I10 PRIMARY HYPERTENSION: Primary | ICD-10-CM

## 2023-11-30 RX ORDER — AMLODIPINE AND VALSARTAN 10; 320 MG/1; MG/1
1 TABLET ORAL DAILY
Qty: 90 TABLET | Refills: 0 | Status: SHIPPED | OUTPATIENT
Start: 2023-11-30 | End: 2024-01-17 | Stop reason: SDUPTHER

## 2024-01-17 DIAGNOSIS — I10 PRIMARY HYPERTENSION: ICD-10-CM

## 2024-01-17 DIAGNOSIS — E78.2 MIXED HYPERLIPIDEMIA: ICD-10-CM

## 2024-01-17 DIAGNOSIS — D50.8 IRON DEFICIENCY ANEMIA SECONDARY TO INADEQUATE DIETARY IRON INTAKE: Primary | ICD-10-CM

## 2024-01-17 DIAGNOSIS — E87.6 HYPOKALEMIA DUE TO EXCESSIVE RENAL LOSS OF POTASSIUM: ICD-10-CM

## 2024-01-17 RX ORDER — FERROUS SULFATE 325(65) MG
325 TABLET ORAL 3 TIMES DAILY
Qty: 90 TABLET | Refills: 0 | Status: SHIPPED | OUTPATIENT
Start: 2024-01-17 | End: 2024-01-18 | Stop reason: SDUPTHER

## 2024-01-17 RX ORDER — ATORVASTATIN CALCIUM 10 MG/1
10 TABLET, FILM COATED ORAL NIGHTLY
Qty: 90 TABLET | Refills: 0 | Status: SHIPPED | OUTPATIENT
Start: 2024-01-17 | End: 2024-04-10

## 2024-01-17 RX ORDER — POTASSIUM CHLORIDE 750 MG/1
10 CAPSULE, EXTENDED RELEASE ORAL DAILY
Qty: 90 CAPSULE | Refills: 0 | Status: SHIPPED | OUTPATIENT
Start: 2024-01-17 | End: 2024-04-10

## 2024-01-17 RX ORDER — AMLODIPINE AND VALSARTAN 10; 320 MG/1; MG/1
1 TABLET ORAL DAILY
Qty: 90 TABLET | Refills: 0 | Status: SHIPPED | OUTPATIENT
Start: 2024-01-17 | End: 2024-05-06 | Stop reason: SDUPTHER

## 2024-01-18 ENCOUNTER — OFFICE VISIT (OUTPATIENT)
Dept: FAMILY MEDICINE | Facility: CLINIC | Age: 49
End: 2024-01-18
Payer: COMMERCIAL

## 2024-01-18 ENCOUNTER — TELEPHONE (OUTPATIENT)
Dept: FAMILY MEDICINE | Facility: CLINIC | Age: 49
End: 2024-01-18
Payer: COMMERCIAL

## 2024-01-18 VITALS
DIASTOLIC BLOOD PRESSURE: 72 MMHG | BODY MASS INDEX: 37.39 KG/M2 | HEART RATE: 105 BPM | HEIGHT: 64 IN | SYSTOLIC BLOOD PRESSURE: 112 MMHG | TEMPERATURE: 99 F | WEIGHT: 219 LBS | OXYGEN SATURATION: 99 %

## 2024-01-18 DIAGNOSIS — D50.8 IRON DEFICIENCY ANEMIA SECONDARY TO INADEQUATE DIETARY IRON INTAKE: ICD-10-CM

## 2024-01-18 DIAGNOSIS — R10.9 LEFT FLANK PAIN: Primary | ICD-10-CM

## 2024-01-18 DIAGNOSIS — F31.32 BIPOLAR AFFECTIVE DISORDER, CURRENTLY DEPRESSED, MODERATE: Chronic | ICD-10-CM

## 2024-01-18 DIAGNOSIS — E55.9 VITAMIN D DEFICIENCY: ICD-10-CM

## 2024-01-18 DIAGNOSIS — H92.01 ACUTE OTALGIA, RIGHT: ICD-10-CM

## 2024-01-18 DIAGNOSIS — R31.0 GROSS HEMATURIA: ICD-10-CM

## 2024-01-18 LAB
BILIRUB SERPL-MCNC: NEGATIVE MG/DL
BLOOD URINE, POC: ABNORMAL
COLOR, POC UA: ABNORMAL
GLUCOSE UR QL STRIP: NEGATIVE
KETONES UR QL STRIP: ABNORMAL
LEUKOCYTE ESTERASE URINE, POC: NEGATIVE
NITRITE, POC UA: NEGATIVE
PH, POC UA: 5.5
PROTEIN, POC: NEGATIVE
SPECIFIC GRAVITY, POC UA: 1.03
UROBILINOGEN, POC UA: 0.2

## 2024-01-18 PROCEDURE — 99214 OFFICE O/P EST MOD 30 MIN: CPT | Mod: ,,, | Performed by: NURSE PRACTITIONER

## 2024-01-18 PROCEDURE — 3074F SYST BP LT 130 MM HG: CPT | Mod: ,,, | Performed by: NURSE PRACTITIONER

## 2024-01-18 PROCEDURE — 1160F RVW MEDS BY RX/DR IN RCRD: CPT | Mod: ,,, | Performed by: NURSE PRACTITIONER

## 2024-01-18 PROCEDURE — 4010F ACE/ARB THERAPY RXD/TAKEN: CPT | Mod: ,,, | Performed by: NURSE PRACTITIONER

## 2024-01-18 PROCEDURE — 1159F MED LIST DOCD IN RCRD: CPT | Mod: ,,, | Performed by: NURSE PRACTITIONER

## 2024-01-18 PROCEDURE — 3008F BODY MASS INDEX DOCD: CPT | Mod: ,,, | Performed by: NURSE PRACTITIONER

## 2024-01-18 PROCEDURE — 81003 URINALYSIS AUTO W/O SCOPE: CPT | Mod: QW,,, | Performed by: NURSE PRACTITIONER

## 2024-01-18 PROCEDURE — 3078F DIAST BP <80 MM HG: CPT | Mod: ,,, | Performed by: NURSE PRACTITIONER

## 2024-01-18 RX ORDER — NEOMYCIN SULFATE, POLYMYXIN B SULFATE AND HYDROCORTISONE 10; 3.5; 1 MG/ML; MG/ML; [USP'U]/ML
4 SUSPENSION/ DROPS AURICULAR (OTIC) 4 TIMES DAILY
Qty: 10 ML | Refills: 0 | Status: CANCELLED | OUTPATIENT
Start: 2024-01-18 | End: 2024-01-25

## 2024-01-18 RX ORDER — FERROUS SULFATE 325(65) MG
325 TABLET ORAL 3 TIMES DAILY
Qty: 270 TABLET | Refills: 0 | Status: SHIPPED | OUTPATIENT
Start: 2024-01-18 | End: 2024-05-06 | Stop reason: SDUPTHER

## 2024-01-18 RX ORDER — AMOXICILLIN AND CLAVULANATE POTASSIUM 875; 125 MG/1; MG/1
1 TABLET, FILM COATED ORAL EVERY 12 HOURS
Qty: 20 TABLET | Refills: 0 | Status: SHIPPED | OUTPATIENT
Start: 2024-01-18 | End: 2024-01-28

## 2024-01-18 RX ORDER — ERGOCALCIFEROL 1.25 MG/1
50000 CAPSULE ORAL
Qty: 90 CAPSULE | Refills: 0 | Status: SHIPPED | OUTPATIENT
Start: 2024-01-18 | End: 2024-05-29

## 2024-01-18 NOTE — PROGRESS NOTES
MARIMAR Diaz    [address] [place] [address]     PATIENT NAME: Yokasta Segal  : 1975  DATE: 24  MRN: [id number]      Billing Provider: MARIMAR Diaz  Level of Service: ME OFFICE/OUTPT VISIT, EST, LEVL IV, 30-39 MIN    ASSESSMENT AND PLAN:      Problem List Items Addressed This Visit          Psychiatric    Bipolar affective disorder, currently depressed, moderate (Chronic)    Current Assessment & Plan     Chronic recurrent problem- uncontrolled  Patient has been on several different medications and wax and wanes with compliance  Recommend counseling and medication therapy by specialist         Relevant Orders    Ambulatory referral/consult to Psychiatry       Oncology    Iron deficiency anemia secondary to inadequate dietary iron intake    Current Assessment & Plan     Chronic problem  Continue ferrous sulfate 325 mg tid         Relevant Medications    ferrous sulfate (FEOSOL) 325 mg (65 mg iron) Tab tablet       Endocrine    Vitamin D deficiency    Current Assessment & Plan     Chronic problem  Continue ergocalciferol weekly  Sunlight daily direct for 10 minutes           Relevant Medications    ergocalciferol (ERGOCALCIFEROL) 50,000 unit Cap     Other Visit Diagnoses       Left flank pain    -  Primary    Relevant Medications    amoxicillin-clavulanate 875-125mg (AUGMENTIN) 875-125 mg per tablet    Other Relevant Orders    POCT URINALYSIS W/O SCOPE (Completed)    Urine culture    Gross hematuria        Relevant Medications    amoxicillin-clavulanate 875-125mg (AUGMENTIN) 875-125 mg per tablet    Other Relevant Orders    Urine culture    Acute otalgia, right        Relevant Medications    amoxicillin-clavulanate 875-125mg (AUGMENTIN) 875-125 mg per tablet            Health Maintenance Topics with due status: Not Due       Topic Last Completion Date    Mammogram 2023    Hemoglobin A1c (Diabetic Prevention Screening) 10/13/2023  "    Future Appointments   Date Time Provider Department Center   4/18/2024  8:00 AM Tatyana Lo, [facility] JULIUS Delacruz      Follow up in about 3 months (around 4/18/2024) for fasting lab with wellness visit. or sooner as needed.      CHIEF COMPLAINT: Flank Pain (Patient state she has been dealing with flank pain for four days. Patient state on yesterday when she whipped she saw blood. Patient got her some AZO on yesterday. ), Otalgia (Patient state she has right ear pain. ), and Medication Problem (Patient want to discuss her medications due to palpations, SOB, feeling drunk, and scared. )           HISTORY OF PRESENT ILLNESS:  Yokasta Segal is a 48 y.o. female who presents to the clinic today     Yokasta Segal presents to the clinic with Flank Pain (Patient state she has been dealing with flank pain for four days. Patient state on yesterday when she whipped she saw blood. Patient got her some AZO on yesterday. ), Otalgia (Patient state she has right ear pain. ), and Medication Problem (Patient want to discuss her medications due to palpations, SOB, feeling drunk, and scared. )     Unable to recall when she took medication and causes the "funny feeling"    Hx bipolar depression and wax and wanes with medication compliance  Tearful during visit today  Has not been seen in clinic since 10/2023    Reports left flank pain three days ago and this only hurts with lying down or turning over in bed  Also reports hematuria for one day  Denies vaginal discharge  Denies concern with STI    Right ear pain with itching without drainage nor fever        PAST MEDICAL HISTORY:      Past Medical History:   Diagnosis Date    Anxiety     Arthritis     Depression     Hyperlipidemia     Hypertension      PAST SURGICAL HISTORY:  Past Surgical History:   Procedure Laterality Date    Left Knee Surgery Left     TUBAL LIGATION       SOCIAL HISTORY:  Social History     Socioeconomic History    Marital status: Single   Tobacco Use "    Smoking status: Never    Smokeless tobacco: Never   Substance and Sexual Activity    Alcohol use: Never    Drug use: Never    Sexual activity: Yes     Partners: Male     FAMILY HISTORY:       Family History   Problem Relation Age of Onset    Cancer Mother     Gout Mother     Thyroid disease Mother     Cancer Father        ALLERGIES AND MEDICATIONS: updated and reviewed.       Review of patient's allergies indicates:  No Known Allergies  Medication List with Changes/Refills   New Medications    AMOXICILLIN-CLAVULANATE 875-125MG (AUGMENTIN) 875-125 MG PER TABLET    Take 1 tablet by mouth every 12 (twelve) hours. for 10 days   Current Medications    AMLODIPINE-VALSARTAN (EXFORGE)  MG PER TABLET    Take 1 tablet by mouth once daily.    ATORVASTATIN (LIPITOR) 10 MG TABLET    Take 1 tablet (10 mg total) by mouth every evening.    POTASSIUM CHLORIDE (MICRO-K) 10 MEQ CPSR    Take 1 capsule (10 mEq total) by mouth once daily.   Changed and/or Refilled Medications    Modified Medication Previous Medication    ERGOCALCIFEROL (ERGOCALCIFEROL) 50,000 UNIT CAP ergocalciferol (ERGOCALCIFEROL) 50,000 unit Cap       Take 1 capsule (50,000 Units total) by mouth every 7 days.    Take 50,000 Units by mouth every 7 days.    FERROUS SULFATE (FEOSOL) 325 MG (65 MG IRON) TAB TABLET ferrous sulfate (FEOSOL) 325 mg (65 mg iron) Tab tablet       Take 1 tablet (325 mg total) by mouth 3 (three) times daily.    Take 1 tablet (325 mg total) by mouth 3 (three) times daily.   Discontinued Medications    BACLOFEN (LIORESAL) 10 MG TABLET    Take 10 mg by mouth 2 (two) times daily as needed.    BUSPIRONE (BUSPAR) 10 MG TABLET    Take 1 tablet (10 mg total) by mouth 3 (three) times daily as needed (anxiety).    IBUPROFEN (ADVIL,MOTRIN) 200 MG TABLET    Take 200 mg by mouth every 6 (six) hours.    MELOXICAM (MOBIC) 7.5 MG TABLET    Take 7.5 mg by mouth once as needed.    MUPIROCIN (BACTROBAN) 2 % OINTMENT    Apply topically 3 (three) times  "daily.    QUETIAPINE (SEROQUEL XR) 50 MG TB24    Take 1 tablet (50 mg total) by mouth every evening. For one night then increase to two pills at bedtime       SCREENING HISTORY:     Health Maintenance         Date Due Completion Date    Cervical Cancer Screening Never done ---    Lipid Panel Never done ---    COVID-19 Vaccine (1) Never done ---    HIV Screening Never done ---    Colorectal Cancer Screening Never done ---    Shingles Vaccine (1 of 2) Never done ---    TETANUS VACCINE 10/15/2024 (Originally 4/17/1991) ---    Mammogram 07/05/2024 7/5/2023    Hemoglobin A1c (Diabetic Prevention Screening) 10/13/2026 10/13/2023            REVIEW OF SYSTEMS:   Review of Systems   HENT:  Positive for ear pain.    Respiratory: Negative.     Cardiovascular: Negative.    Genitourinary:  Positive for hematuria.   Psychiatric/Behavioral:  Positive for decreased concentration and dysphoric mood. The patient is nervous/anxious.          PHYSICAL EXAM:         /72 (BP Location: Left arm, Patient Position: Sitting)   Pulse 105   Temp 98.8 °F (37.1 °C) (Oral)   Ht 5' 3.5" (1.613 m)   Wt 99.3 kg (219 lb)   LMP 01/01/2024   SpO2 99%   BMI 38.19 kg/m²  Patient's last menstrual period was 01/01/2024.  Wt Readings from Last 3 Encounters:   01/18/24 99.3 kg (219 lb)   10/13/23 97.1 kg (214 lb)     BP Readings from Last 3 Encounters:   01/18/24 112/72   10/13/23 (!) 139/90     Estimated body mass index is 38.19 kg/m² as calculated from the following:    Height as of this encounter: 5' 3.5" (1.613 m).    Weight as of this encounter: 99.3 kg (219 lb).     Physical Exam  HENT:      Right Ear: There is impacted cerumen.      Ears:      Comments: Removed cerumen with curette       Nose: Mucosal edema present.      Mouth/Throat:      Lips: Pink.      Mouth: Mucous membranes are moist.      Pharynx: Oropharynx is clear.   Cardiovascular:      Rate and Rhythm: Normal rate and regular rhythm.      Pulses: Normal pulses.      Heart " sounds: Normal heart sounds.   Pulmonary:      Effort: Pulmonary effort is normal.      Breath sounds: Normal breath sounds.   Abdominal:      Palpations: Abdomen is soft.   Musculoskeletal:      Cervical back: Neck supple.   Neurological:      Mental Status: She is alert.   Psychiatric:         Mood and Affect: Affect is labile, flat and tearful.         Speech: Speech is delayed.         Behavior: Behavior is cooperative.         Thought Content: Thought content is not delusional. Thought content does not include homicidal or suicidal ideation. Thought content does not include homicidal or suicidal plan.         Cognition and Memory: Cognition and memory normal.         Judgment: Judgment normal.         LABS:     I have reviewed old labs below:  Lab Results   Component Value Date    WBC 7.45 10/13/2023    HGB 13.3 10/13/2023    HCT 42.3 10/13/2023    MCV 90.4 10/13/2023     (H) 10/13/2023     10/13/2023    K 3.0 (L) 10/13/2023     (H) 10/13/2023    CALCIUM 9.1 10/13/2023    CO2 23 10/13/2023    GLU 67 (L) 10/13/2023    BUN 7 10/13/2023    CREATININE 0.85 10/13/2023    ANIONGAP 11 10/13/2023    PROT 7.5 10/13/2023    ALBUMIN 3.5 10/13/2023    BILITOT 0.2 10/13/2023    ALKPHOS 56 10/13/2023    ALT 18 10/13/2023    AST 13 (L) 10/13/2023    TSH 0.837 10/13/2023    HGBA1C 5.8 10/13/2023    MICROALBUR 1.8 10/13/2023           Signature:  Tatyana Lo [address] [place] [address]  Phone #: [phone]  Fax #: [phone]       Date of encounter: 1/18/24    Patient Instructions   For cerumen: Avoid qtips or anything smaller than your finger into ear  Apply 1-2 drops baby oil or mineral oil to each ear daily prior to bath or shower for wax prevention buildup  Warm moist washcloth over ear as needed for pain, if this causes increase in pain, change to cold moist washcloth over ear    For urinary symptoms:  Augmentin 875 mg twice a day for 10  days  Complete your oral antibiotic as prescribed  Increase water intake daily with goal of 1/2 body weight in ounces of water each day (ex. If you weight 120 lbs, your goal is 60 ounces of water each day)  Always wipe front to back with each void  Stop to void each time you feel the urge  Avoid constipation with increasing fruits and vegetables in your diet  If a urine culture was ordered, you will be notified once the results are reviewed  If symptoms you are experiencing have not improved within three days, return to the clinic for recheck    Refer to Damaris or keyshawn Mann for help with medication for depression with bipolar affect

## 2024-01-18 NOTE — ASSESSMENT & PLAN NOTE
Chronic recurrent problem- uncontrolled  Patient has been on several different medications and wax and wanes with compliance  Recommend counseling and medication therapy by specialist

## 2024-01-18 NOTE — TELEPHONE ENCOUNTER
----- Message from Amanda Hendrix sent at 1/18/2024  8:24 AM CST -----  Pt had left a voice mail asking for a nurse to call her back @ [phone]. I tried to call her back but she did not answer

## 2024-01-18 NOTE — PATIENT INSTRUCTIONS
For cerumen: Avoid qtips or anything smaller than your finger into ear  Apply 1-2 drops baby oil or mineral oil to each ear daily prior to bath or shower for wax prevention buildup  Warm moist washcloth over ear as needed for pain, if this causes increase in pain, change to cold moist washcloth over ear    For urinary symptoms:  Augmentin 875 mg twice a day for 10 days  Complete your oral antibiotic as prescribed  Increase water intake daily with goal of 1/2 body weight in ounces of water each day (ex. If you weight 120 lbs, your goal is 60 ounces of water each day)  Always wipe front to back with each void  Stop to void each time you feel the urge  Avoid constipation with increasing fruits and vegetables in your diet  If a urine culture was ordered, you will be notified once the results are reviewed  If symptoms you are experiencing have not improved within three days, return to the clinic for recheck    Refer to Damaris or keyshawn Mann for help with medication for depression with bipolar affect

## 2024-01-18 NOTE — TELEPHONE ENCOUNTER
Pt called c/o flank pain bilaterally and blood upon wiping after urination. She think she might have a UTI or bladder infection. Wanted to be scheduled to come into clinic. Scheduled pt for 10:40 this morning. Verbalized understanding.

## 2024-01-19 ENCOUNTER — TELEPHONE (OUTPATIENT)
Dept: FAMILY MEDICINE | Facility: CLINIC | Age: 49
End: 2024-01-19
Payer: COMMERCIAL

## 2024-01-19 NOTE — TELEPHONE ENCOUNTER
Contacted the referral team this morning regarding the pt referall to psychiatry with Dr. Mann. Stated they would fax over the information to their office this morning. Called to update the patient on the status and to give us a call if they do not  reach out to schedule by the end of next week. Verbalized understanding.

## 2024-01-30 ENCOUNTER — TELEPHONE (OUTPATIENT)
Dept: FAMILY MEDICINE | Facility: CLINIC | Age: 49
End: 2024-01-30
Payer: COMMERCIAL

## 2024-01-30 NOTE — TELEPHONE ENCOUNTER
Contacted patient regarding overdue Health Maintenance. Patient state she will have Pap Smear done at next visit with her PCP.

## 2024-01-31 ENCOUNTER — TELEPHONE (OUTPATIENT)
Dept: FAMILY MEDICINE | Facility: CLINIC | Age: 49
End: 2024-01-31
Payer: COMMERCIAL

## 2024-01-31 NOTE — TELEPHONE ENCOUNTER
Pt returned call to discuss Health Maintenance. She stated she had her colonoscopy done with Dr. Castillo in [place], requesting those records. Refused all other care gaps. I also checked on pts psychiatry referral per pt request. Referral team stated it should be scheduled by the end of the week notified pt and stated if they did not contact her to let me know. Verbalized understanding.

## 2024-01-31 NOTE — LETTER
AUTHORIZATION FOR RELEASE OF   CONFIDENTIAL INFORMATION    Dear Dr. Castillo,    We are seeing Yokasta Segal, date of birth 1975, in the clinic at [facility]. Tatyana oL FNP is the patient's PCP. Yokasta Segal has an outstanding lab/procedure at the time we reviewed her chart. In order to help keep her health information updated, she has authorized us to request the following medical record(s):        (  )  MAMMOGRAM                                      ( x )  COLONOSCOPY      (  )  PAP SMEAR                                          (  )  OUTSIDE LAB RESULTS     (  )  DEXA SCAN                                          (  )  EYE EXAM            (  )  FOOT EXAM                                          (  )  ENTIRE RECORD     (  )  OUTSIDE IMMUNIZATIONS                 (  )  _______________         Please fax records to Tatyana Lo FNP, [phone]     If you have any questions, please contact Basia Hua LPN at [phone].           Patient Name: Yokasta Segal  : 1975  Patient Phone #: [phone]

## 2024-02-23 ENCOUNTER — TELEPHONE (OUTPATIENT)
Dept: FAMILY MEDICINE | Facility: CLINIC | Age: 49
End: 2024-02-23
Payer: COMMERCIAL

## 2024-02-23 NOTE — TELEPHONE ENCOUNTER
Contacted the referral team and discussed the referral with aDgoberto. She states she was nto sure why this referral has not been scheduled. She is going to pull the referral and call the office and get the pt scheduled today. Stated she would give the pt a call and notify her when the appt is scheduled. Verbalized understanding.

## 2024-02-23 NOTE — TELEPHONE ENCOUNTER
Pt left a voicemail stating that she has not heard back regarding her referral to psychiatry. I attempted to contact the pt to verify who she was seeing and to check on the referal. Will contact the referral team to check on status.

## 2024-04-09 DIAGNOSIS — E87.6 HYPOKALEMIA DUE TO EXCESSIVE RENAL LOSS OF POTASSIUM: ICD-10-CM

## 2024-04-09 DIAGNOSIS — E78.2 MIXED HYPERLIPIDEMIA: ICD-10-CM

## 2024-04-10 RX ORDER — POTASSIUM CHLORIDE 750 MG/1
10 CAPSULE, EXTENDED RELEASE ORAL
Qty: 90 CAPSULE | Refills: 0 | Status: SHIPPED | OUTPATIENT
Start: 2024-04-10 | End: 2024-05-06 | Stop reason: SDUPTHER

## 2024-04-10 RX ORDER — ATORVASTATIN CALCIUM 10 MG/1
10 TABLET, FILM COATED ORAL NIGHTLY
Qty: 90 TABLET | Refills: 0 | Status: SHIPPED | OUTPATIENT
Start: 2024-04-10 | End: 2024-05-06 | Stop reason: SDUPTHER

## 2024-05-06 ENCOUNTER — OFFICE VISIT (OUTPATIENT)
Dept: FAMILY MEDICINE | Facility: CLINIC | Age: 49
End: 2024-05-06
Payer: COMMERCIAL

## 2024-05-06 VITALS
HEIGHT: 64 IN | SYSTOLIC BLOOD PRESSURE: 139 MMHG | HEART RATE: 90 BPM | WEIGHT: 226 LBS | RESPIRATION RATE: 18 BRPM | DIASTOLIC BLOOD PRESSURE: 87 MMHG | BODY MASS INDEX: 38.58 KG/M2 | OXYGEN SATURATION: 98 % | TEMPERATURE: 99 F

## 2024-05-06 DIAGNOSIS — I10 PRIMARY HYPERTENSION: Primary | ICD-10-CM

## 2024-05-06 DIAGNOSIS — D50.8 IRON DEFICIENCY ANEMIA SECONDARY TO INADEQUATE DIETARY IRON INTAKE: ICD-10-CM

## 2024-05-06 DIAGNOSIS — Z12.4 SCREENING FOR MALIGNANT NEOPLASM OF THE CERVIX: ICD-10-CM

## 2024-05-06 DIAGNOSIS — E78.2 MIXED HYPERLIPIDEMIA: ICD-10-CM

## 2024-05-06 DIAGNOSIS — E87.6 HYPOKALEMIA DUE TO EXCESSIVE RENAL LOSS OF POTASSIUM: ICD-10-CM

## 2024-05-06 PROCEDURE — 1159F MED LIST DOCD IN RCRD: CPT | Mod: ,,, | Performed by: FAMILY MEDICINE

## 2024-05-06 PROCEDURE — 3008F BODY MASS INDEX DOCD: CPT | Mod: ,,, | Performed by: FAMILY MEDICINE

## 2024-05-06 PROCEDURE — 3079F DIAST BP 80-89 MM HG: CPT | Mod: ,,, | Performed by: FAMILY MEDICINE

## 2024-05-06 PROCEDURE — 3075F SYST BP GE 130 - 139MM HG: CPT | Mod: ,,, | Performed by: FAMILY MEDICINE

## 2024-05-06 PROCEDURE — 4010F ACE/ARB THERAPY RXD/TAKEN: CPT | Mod: ,,, | Performed by: FAMILY MEDICINE

## 2024-05-06 PROCEDURE — 1160F RVW MEDS BY RX/DR IN RCRD: CPT | Mod: ,,, | Performed by: FAMILY MEDICINE

## 2024-05-06 PROCEDURE — 99214 OFFICE O/P EST MOD 30 MIN: CPT | Mod: ,,, | Performed by: FAMILY MEDICINE

## 2024-05-06 PROCEDURE — 88141 CYTOPATH C/V INTERPRET: CPT | Mod: ,,, | Performed by: PATHOLOGY

## 2024-05-06 PROCEDURE — 88142 CYTOPATH C/V THIN LAYER: CPT | Mod: TC,GCY | Performed by: FAMILY MEDICINE

## 2024-05-06 NOTE — LETTER
AUTHORIZATION FOR RELEASE OF   CONFIDENTIAL INFORMATION    Dear THIAGO Sommers,    We are seeing Yokasta Segal, date of birth 1975, in the clinic at [facility]. Malena Alejo MD, is the patient's PCP. Yokasta Segal has an outstanding lab/procedure at the time we reviewed her chart. In order to help keep her health information updated, she has authorized us to request the following medical record(s):        (  )  MAMMOGRAM                                      ( X )  COLONOSCOPY      (  )  PAP SMEAR                                          (  )  OUTSIDE LAB RESULTS     (  )  DEXA SCAN                                          (  )  EYE EXAM            (  )  FOOT EXAM                                          (  )  ENTIRE RECORD     (  )  OUTSIDE IMMUNIZATIONS                 (  )  _______________         Please fax records to [facility], Krista Boyette, MD, [phone].     If you have any questions, please contact Uyen at [phone].           Patient Name: Yokasta Segal  : 1975  Patient Phone #: [phone]

## 2024-05-06 NOTE — PROGRESS NOTES
"New Clinic Note    Yokasta Segal is a 49 y.o. female     CC:   Chief Complaint   Patient presents with    Annual Exam     Patient stated she is here for a  annual yearly health exam, to have a yearly PAP and "fasting"  labs. Stated she has been trying to follow back up with MARIMAR Ortega but she has been out so she is asking to be seen by another PCP. Stated she needs to discuss her last Iron level and  last Vitamin D level and see if she needs medication.  Had "fasting" labs drawn earlier this am so she could go ahead and eat.        Subjective    History of Present Illness HPI   Patient is for evaluation of chronic medical problems. Patient needs refills. Yokasta  is tolerating medications well without side effects.   She needs a Pap smear.    Current Outpatient Medications:     ergocalciferol (ERGOCALCIFEROL) 50,000 unit Cap, Take 1 capsule (50,000 Units total) by mouth every 7 days., Disp: 90 capsule, Rfl: 0    amlodipine-valsartan (EXFORGE)  mg per tablet, Take 1 tablet by mouth once daily., Disp: 90 tablet, Rfl: 0    atorvastatin (LIPITOR) 10 MG tablet, Take 1 tablet (10 mg total) by mouth every evening., Disp: 90 tablet, Rfl: 0    ferrous sulfate (FEOSOL) 325 mg (65 mg iron) Tab tablet, Take 1 tablet (325 mg total) by mouth 3 (three) times daily., Disp: 270 tablet, Rfl: 1    potassium chloride (MICRO-K) 10 MEQ CpSR, Take 1 capsule (10 mEq total) by mouth once daily., Disp: 90 capsule, Rfl: 0     Past Medical History:   Diagnosis Date    Anxiety     Arthritis     Depression     Hyperlipidemia     Hypertension         Family History   Problem Relation Name Age of Onset    Cancer Mother      Gout Mother      Thyroid disease Mother      Cancer Father          Past Surgical History:   Procedure Laterality Date    Left Knee Surgery Left     TUBAL LIGATION          Social History     Socioeconomic History    Marital status: Single   Tobacco Use    Smoking status: Never     Passive exposure: Never    " "Smokeless tobacco: Never   Substance and Sexual Activity    Alcohol use: Never    Drug use: Never    Sexual activity: Yes     Partners: Male        Review of Systems   Constitutional:  Negative for fatigue and fever.   HENT:  Negative for ear pain, postnasal drip, rhinorrhea and sinus pressure/congestion.    Respiratory:  Negative for cough and shortness of breath.    Cardiovascular:  Negative for chest pain.   Gastrointestinal:  Negative for abdominal pain, diarrhea, nausea and vomiting.   Genitourinary:  Negative for dysuria.   Neurological:  Negative for headaches.        /87 (BP Location: Left arm, Patient Position: Sitting, BP Method: Large (Automatic))   Pulse 90   Temp 99.2 °F (37.3 °C) (Oral)   Resp 18   Ht 5' 3.5" (1.613 m)   Wt 102.5 kg (226 lb)   LMP 04/05/2024   SpO2 98%   BMI 39.41 kg/m²      Physical Exam  Exam conducted with a chaperone present.   HENT:      Head: Normocephalic and atraumatic.   Cardiovascular:      Rate and Rhythm: Normal rate and regular rhythm.   Pulmonary:      Effort: Pulmonary effort is normal.      Breath sounds: Normal breath sounds.   Genitourinary:     General: Normal vulva.      Vagina: Normal.      Cervix: Normal.      Uterus: Normal.       Adnexa: Right adnexa normal and left adnexa normal.   Neurological:      Mental Status: She is alert and oriented to person, place, and time.   Psychiatric:         Mood and Affect: Mood normal.         Behavior: Behavior normal.          Assessment and Plan      ICD-10-CM ICD-9-CM   1. Primary hypertension  I10 401.9   2. Iron deficiency anemia secondary to inadequate dietary iron intake  D50.8 280.1   3. Mixed hyperlipidemia  E78.2 272.2   4. Hypokalemia due to excessive renal loss of potassium  E87.6 276.8   5. Screening for malignant neoplasm of the cervix  Z12.4 V76.2        1. Primary hypertension  The current medical regimen is effective;  continue present plan and medications.  -     amlodipine-valsartan (EXFORGE) "  mg per tablet; Take 1 tablet by mouth once daily.  Dispense: 90 tablet; Refill: 0    2. Iron deficiency anemia secondary to inadequate dietary iron intake  The current medical regimen is effective;  continue present plan and medications.  -     ferrous sulfate (FEOSOL) 325 mg (65 mg iron) Tab tablet; Take 1 tablet (325 mg total) by mouth 3 (three) times daily.  Dispense: 270 tablet; Refill: 1    3. Mixed hyperlipidemia  The current medical regimen is effective;  continue present plan and medications.  -     atorvastatin (LIPITOR) 10 MG tablet; Take 1 tablet (10 mg total) by mouth every evening.  Dispense: 90 tablet; Refill: 0    4. Hypokalemia due to excessive renal loss of potassium  The current medical regimen is effective;  continue present plan and medications.  -     potassium chloride (MICRO-K) 10 MEQ CpSR; Take 1 capsule (10 mEq total) by mouth once daily.  Dispense: 90 capsule; Refill: 0    5. Screening for malignant neoplasm of the cervix  -     ThinPrep Pap Test         Follow up in about 6 months (around 11/6/2024).     This information was created by a scribe under my supervision and in my presence. I have reviewed and agree with the scribe's documentation.

## 2024-05-08 RX ORDER — POTASSIUM CHLORIDE 750 MG/1
10 CAPSULE, EXTENDED RELEASE ORAL DAILY
Qty: 90 CAPSULE | Refills: 0 | Status: SHIPPED | OUTPATIENT
Start: 2024-05-08

## 2024-05-08 RX ORDER — AMLODIPINE AND VALSARTAN 10; 320 MG/1; MG/1
1 TABLET ORAL DAILY
Qty: 90 TABLET | Refills: 0 | Status: SHIPPED | OUTPATIENT
Start: 2024-05-08

## 2024-05-08 RX ORDER — ATORVASTATIN CALCIUM 10 MG/1
10 TABLET, FILM COATED ORAL NIGHTLY
Qty: 90 TABLET | Refills: 0 | Status: SHIPPED | OUTPATIENT
Start: 2024-05-08

## 2024-05-08 RX ORDER — FERROUS SULFATE 325(65) MG
325 TABLET ORAL 3 TIMES DAILY
Qty: 270 TABLET | Refills: 1 | Status: SHIPPED | OUTPATIENT
Start: 2024-05-08 | End: 2024-05-10 | Stop reason: SDUPTHER

## 2024-05-10 ENCOUNTER — TELEPHONE (OUTPATIENT)
Dept: FAMILY MEDICINE | Facility: CLINIC | Age: 49
End: 2024-05-10
Payer: COMMERCIAL

## 2024-05-10 DIAGNOSIS — R87.619 ABNORMAL CERVICAL PAPANICOLAOU SMEAR, UNSPECIFIED ABNORMAL PAP FINDING: Primary | ICD-10-CM

## 2024-05-10 DIAGNOSIS — D50.8 IRON DEFICIENCY ANEMIA SECONDARY TO INADEQUATE DIETARY IRON INTAKE: ICD-10-CM

## 2024-05-10 LAB
GH SERPL-MCNC: ABNORMAL NG/ML
INSULIN SERPL-ACNC: ABNORMAL U[IU]/ML
LAB AP CLINICAL INFORMATION: ABNORMAL
LAB AP GYN INTERPRETATION: ABNORMAL
LAB AP PAP DISCLAIMER COMMENTS: ABNORMAL
RENIN PLAS-CCNC: ABNORMAL NG/ML/H

## 2024-05-10 RX ORDER — FERROUS SULFATE 325(65) MG
325 TABLET ORAL 3 TIMES DAILY
Qty: 270 TABLET | Refills: 1 | Status: SHIPPED | OUTPATIENT
Start: 2024-05-10

## 2024-05-10 NOTE — TELEPHONE ENCOUNTER
Patient notified of abnormal Pap. Stated the last time she had a PAP was 29 years ago at [facility] and it was abnormal. Stated she had a biopsy performed and it scared her so bad she never had another PAP performed. She wants to go to [place] to first available.

## 2024-05-29 ENCOUNTER — OFFICE VISIT (OUTPATIENT)
Dept: FAMILY MEDICINE | Facility: CLINIC | Age: 49
End: 2024-05-29
Payer: COMMERCIAL

## 2024-05-29 VITALS
DIASTOLIC BLOOD PRESSURE: 88 MMHG | SYSTOLIC BLOOD PRESSURE: 133 MMHG | WEIGHT: 225.81 LBS | TEMPERATURE: 98 F | HEIGHT: 64 IN | BODY MASS INDEX: 38.55 KG/M2 | HEART RATE: 73 BPM | OXYGEN SATURATION: 100 % | RESPIRATION RATE: 18 BRPM

## 2024-05-29 DIAGNOSIS — F32.A ANXIETY AND DEPRESSION: Primary | ICD-10-CM

## 2024-05-29 DIAGNOSIS — F41.9 ANXIETY AND DEPRESSION: Primary | ICD-10-CM

## 2024-05-29 PROCEDURE — 1160F RVW MEDS BY RX/DR IN RCRD: CPT | Mod: ,,, | Performed by: NURSE PRACTITIONER

## 2024-05-29 PROCEDURE — 3079F DIAST BP 80-89 MM HG: CPT | Mod: ,,, | Performed by: NURSE PRACTITIONER

## 2024-05-29 PROCEDURE — 3075F SYST BP GE 130 - 139MM HG: CPT | Mod: ,,, | Performed by: NURSE PRACTITIONER

## 2024-05-29 PROCEDURE — 99214 OFFICE O/P EST MOD 30 MIN: CPT | Mod: ,,, | Performed by: NURSE PRACTITIONER

## 2024-05-29 PROCEDURE — 1159F MED LIST DOCD IN RCRD: CPT | Mod: ,,, | Performed by: NURSE PRACTITIONER

## 2024-05-29 PROCEDURE — 4010F ACE/ARB THERAPY RXD/TAKEN: CPT | Mod: ,,, | Performed by: NURSE PRACTITIONER

## 2024-05-29 PROCEDURE — 3008F BODY MASS INDEX DOCD: CPT | Mod: ,,, | Performed by: NURSE PRACTITIONER

## 2024-05-29 RX ORDER — MULTIVIT WITH MINERALS/HERBS
1 TABLET ORAL DAILY
COMMUNITY

## 2024-05-29 RX ORDER — FLUOXETINE HYDROCHLORIDE 20 MG/1
20 CAPSULE ORAL DAILY
Qty: 30 CAPSULE | Refills: 1 | Status: SHIPPED | OUTPATIENT
Start: 2024-05-29 | End: 2025-05-29

## 2024-05-29 RX ORDER — LORAZEPAM 1 MG/1
1 TABLET ORAL
COMMUNITY
Start: 2024-05-28 | End: 2024-06-01

## 2024-05-29 NOTE — LETTER
May 29, 2024      [facility] [address] [place] [address]  Phone: [phone]  Fax: [phone]       Patient: Yokasta Segal   YOB: 1975  Date of Visit: 05/29/2024    To Whom It May Concern:    Modesto Segal  was at [facility] on 05/29/2024. The patient may return to work/school on 5/29/24 with no restrictions. If you have any questions or concerns, or if I can be of further assistance, please do not hesitate to contact me.    Sincerely,    EMILIA Massey DNP, FNP-C

## 2024-05-29 NOTE — PROGRESS NOTES
"   Charissa Amador DNP, FNP    [address] [place] [address]     PATIENT NAME: Yokasta Segal  : 1975  DATE: 24  MRN: [id number]      Billing Provider: Charissa Amador DNP, FNP  Level of Service:   Patient PCP Information       Provider PCP Type    [facility]            Reason for Visit / Chief Complaint: Pain (Sharp pain in her head, neck, and lower back. States she feels "numbness or tingling when sitting" in both sides, but it is worse on the left. She has been told she has a bulging disc. Pain underneath her shoulder blade on the right side. Went to ER yesterday. States symptoms started around 2 weeks ago.)       Update PCP  Update Chief Complaint         History of Present Illness / Problem Focused Workflow     Yokasta Segal presents to the clinic with Pain (Sharp pain in her head, neck, and lower back. States she feels "numbness or tingling when sitting" in both sides, but it is worse on the left. She has been told she has a bulging disc. Pain underneath her shoulder blade on the right side. Went to ER yesterday. States symptoms started around 2 weeks ago.)   Pt and aunt in the room states "There is a lot going on."   Pt has not been taking any antidepressants/antianxiety meds, which she has been prescribed in the past. Last was Seroquel and took wellbutrin prior to that.     Pain  Pertinent negatives include no abdominal pain, arthralgias, change in bowel habit, chest pain, chills, congestion, coughing, fatigue, fever, headaches, myalgias, nausea, rash, sore throat, vomiting or weakness.       Review of Systems     Review of Systems   Constitutional:  Negative for activity change, appetite change, chills, fatigue and fever.   HENT:  Negative for nasal congestion, ear pain, hearing loss, postnasal drip and sore throat.    Respiratory:  Negative for cough, chest tightness, shortness of breath and wheezing.    Cardiovascular:  Negative for " chest pain, palpitations, leg swelling and claudication.   Gastrointestinal:  Negative for abdominal pain, change in bowel habit, constipation, diarrhea, nausea and vomiting.   Genitourinary:  Negative for dysuria.   Musculoskeletal:  Negative for arthralgias, back pain, gait problem and myalgias.   Integumentary:  Negative for rash.   Neurological:  Negative for weakness and headaches.   Psychiatric/Behavioral:  Negative for suicidal ideas. The patient is not nervous/anxious.         Medical / Social / Family History     Past Medical History:   Diagnosis Date    Anxiety     Arthritis     Depression     Hyperlipidemia     Hypertension        Past Surgical History:   Procedure Laterality Date    Left Knee Surgery Left     TUBAL LIGATION         Social History  Ms. Yokasta Segal  reports that she has never smoked. She has never been exposed to tobacco smoke. She has never used smokeless tobacco. She reports that she does not drink alcohol and does not use drugs.    Family History  Ms. Yokasta Segal's family history includes Cancer in her father and mother; Gout in her mother; Thyroid disease in her mother.    Medications and Allergies     Medications  Outpatient Medications Marked as Taking for the 5/29/24 encounter (Office Visit) with Charissa Amador, RHOINI, FNP   Medication Sig Dispense Refill    amlodipine-valsartan (EXFORGE)  mg per tablet Take 1 tablet by mouth once daily. 90 tablet 0    atorvastatin (LIPITOR) 10 MG tablet Take 1 tablet (10 mg total) by mouth every evening. 90 tablet 0    b complex vitamins tablet Take 1 tablet by mouth once daily.      ferrous sulfate (FEOSOL) 325 mg (65 mg iron) Tab tablet Take 1 tablet (325 mg total) by mouth 3 (three) times daily. 270 tablet 1    LORazepam (ATIVAN) 1 MG tablet Take 1 mg by mouth.      potassium chloride (MICRO-K) 10 MEQ CpSR Take 1 capsule (10 mEq total) by mouth once daily. 90 capsule 0       Allergies  Review of patient's allergies indicates:  No Known  "Allergies    Physical Examination     Vitals:    05/29/24 0818 05/29/24 0827   BP: (!) 143/89 133/88   BP Location: Right arm Left arm   Patient Position: Sitting Sitting   BP Method: Large (Automatic) Large (Automatic)   Pulse: 73    Resp: 18    Temp: 98 °F (36.7 °C)    TempSrc: Oral    SpO2: 100%    Weight: 102.4 kg (225 lb 12.8 oz)    Height: 5' 3.5" (1.613 m)      Physical Exam  Vitals and nursing note reviewed.   Constitutional:       General: She is not in acute distress.     Appearance: Normal appearance. She is not ill-appearing.   Eyes:      Extraocular Movements: Extraocular movements intact.      Pupils: Pupils are equal, round, and reactive to light.   Cardiovascular:      Rate and Rhythm: Normal rate and regular rhythm.      Heart sounds: Normal heart sounds.   Pulmonary:      Effort: Pulmonary effort is normal.      Breath sounds: Normal breath sounds.   Abdominal:      General: Bowel sounds are normal.      Palpations: Abdomen is soft.   Musculoskeletal:         General: Normal range of motion.   Skin:     Findings: No rash.   Neurological:      General: No focal deficit present.      Mental Status: She is alert and oriented to person, place, and time. Mental status is at baseline.   Psychiatric:         Mood and Affect: Mood normal. Affect is tearful.         Behavior: Behavior normal.          Assessment and Plan (including Health Maintenance)      Problem List  Smart Sets  Document Outside HM   :    Plan:         Health Maintenance Due   Topic Date Due    HIV Screening  Never done    Shingles Vaccine (1 of 2) Never done    COVID-19 Vaccine (1 - 2023-24 season) Never done    Mammogram  07/05/2024       Problem List Items Addressed This Visit    None  Visit Diagnoses       Anxiety and depression    -  Primary    Relevant Medications    FLUoxetine 20 MG capsule          Anxiety and depression  -     FLUoxetine 20 MG capsule; Take 1 capsule (20 mg total) by mouth once daily.  Dispense: 30 capsule; " Refill: 1       Health Maintenance Topics with due status: Not Due       Topic Last Completion Date    Colorectal Cancer Screening 07/11/2023    Hemoglobin A1c (Diabetic Prevention Screening) 10/13/2023    Cervical Cancer Screening 05/06/2024    Lipid Panel 05/06/2024         Future Appointments   Date Time Provider Department Center   6/24/2024  1:40 PM Malena Alejo MD [facility]US Delac[facility]   11/6/2024  9[facility]        Follow up in about 4 weeks (around 6/26/2024).     Signature:  Charissa Amador DNP, FNP  [address] [place] [address]  Phone #: [phone]  Fax #: [phone]    Date of encounter: 5/29/24    Patient Instructions   Take Prozac every morning. Use lorazepam (pt received rx last pm from ED) only as needed for severe anxiety. Will check on appt with [facility]. Follow up in 3-4 weeks, pt asked to see Dr. Alejo.

## 2024-05-29 NOTE — PATIENT INSTRUCTIONS
Take Prozac every morning. Use lorazepam (pt received rx last pm from ED) only as needed for severe anxiety. Will check on appt with [facility]. Follow up in 3-4 weeks, pt asked to see Dr. Alejo.

## 2024-05-30 ENCOUNTER — TELEPHONE (OUTPATIENT)
Dept: FAMILY MEDICINE | Facility: CLINIC | Age: 49
End: 2024-05-30
Payer: COMMERCIAL

## 2024-05-30 NOTE — TELEPHONE ENCOUNTER
Spoke with Dagoberto with the referral department regarding patient's psychiatry referral. She states that [facility] is unable to schedule her due to her owing a fee for being a no show at her last appointment. They have notified the patient of this, and she has still not paid the fee.

## 2024-06-09 DIAGNOSIS — Z71.89 COMPLEX CARE COORDINATION: ICD-10-CM

## 2024-06-17 ENCOUNTER — HOSPITAL ENCOUNTER (OUTPATIENT)
Dept: RADIOLOGY | Facility: HOSPITAL | Age: 49
Discharge: HOME | End: 2024-06-17
Attending: FAMILY MEDICINE
Payer: COMMERCIAL

## 2024-06-17 ENCOUNTER — OFFICE VISIT (OUTPATIENT)
Dept: FAMILY MEDICINE | Facility: CLINIC | Age: 49
End: 2024-06-17
Payer: COMMERCIAL

## 2024-06-17 VITALS
HEIGHT: 64 IN | TEMPERATURE: 100 F | RESPIRATION RATE: 18 BRPM | DIASTOLIC BLOOD PRESSURE: 80 MMHG | BODY MASS INDEX: 38.24 KG/M2 | WEIGHT: 224 LBS | HEART RATE: 87 BPM | SYSTOLIC BLOOD PRESSURE: 121 MMHG | OXYGEN SATURATION: 100 %

## 2024-06-17 DIAGNOSIS — M54.40 ACUTE LOW BACK PAIN WITH SCIATICA, SCIATICA LATERALITY UNSPECIFIED, UNSPECIFIED BACK PAIN LATERALITY: ICD-10-CM

## 2024-06-17 DIAGNOSIS — M25.562 ACUTE PAIN OF LEFT KNEE: ICD-10-CM

## 2024-06-17 DIAGNOSIS — M25.562 ACUTE PAIN OF LEFT KNEE: Primary | ICD-10-CM

## 2024-06-17 PROCEDURE — 4010F ACE/ARB THERAPY RXD/TAKEN: CPT | Mod: ,,, | Performed by: FAMILY MEDICINE

## 2024-06-17 PROCEDURE — 1159F MED LIST DOCD IN RCRD: CPT | Mod: ,,, | Performed by: FAMILY MEDICINE

## 2024-06-17 PROCEDURE — 96372 THER/PROPH/DIAG INJ SC/IM: CPT | Mod: ,,, | Performed by: FAMILY MEDICINE

## 2024-06-17 PROCEDURE — 3079F DIAST BP 80-89 MM HG: CPT | Mod: ,,, | Performed by: FAMILY MEDICINE

## 2024-06-17 PROCEDURE — 73562 X-RAY EXAM OF KNEE 3: CPT | Mod: TC,PN,LT

## 2024-06-17 PROCEDURE — 72110 X-RAY EXAM L-2 SPINE 4/>VWS: CPT | Mod: TC,PN

## 2024-06-17 PROCEDURE — 1160F RVW MEDS BY RX/DR IN RCRD: CPT | Mod: ,,, | Performed by: FAMILY MEDICINE

## 2024-06-17 PROCEDURE — 3074F SYST BP LT 130 MM HG: CPT | Mod: ,,, | Performed by: FAMILY MEDICINE

## 2024-06-17 PROCEDURE — 99213 OFFICE O/P EST LOW 20 MIN: CPT | Mod: 25,,, | Performed by: FAMILY MEDICINE

## 2024-06-17 PROCEDURE — 3008F BODY MASS INDEX DOCD: CPT | Mod: ,,, | Performed by: FAMILY MEDICINE

## 2024-06-17 RX ORDER — METHYLPREDNISOLONE ACETATE 40 MG/ML
40 INJECTION, SUSPENSION INTRA-ARTICULAR; INTRALESIONAL; INTRAMUSCULAR; SOFT TISSUE
Status: COMPLETED | OUTPATIENT
Start: 2024-06-17 | End: 2024-06-17

## 2024-06-17 RX ORDER — DEXAMETHASONE SODIUM PHOSPHATE 4 MG/ML
4 INJECTION, SOLUTION INTRA-ARTICULAR; INTRALESIONAL; INTRAMUSCULAR; INTRAVENOUS; SOFT TISSUE
Status: COMPLETED | OUTPATIENT
Start: 2024-06-17 | End: 2024-06-17

## 2024-06-17 RX ORDER — NAPROXEN 500 MG/1
500 TABLET ORAL 2 TIMES DAILY PRN
Qty: 60 TABLET | Refills: 1 | Status: SHIPPED | OUTPATIENT
Start: 2024-06-17

## 2024-06-17 RX ADMIN — DEXAMETHASONE SODIUM PHOSPHATE 4 MG: 4 INJECTION, SOLUTION INTRA-ARTICULAR; INTRALESIONAL; INTRAMUSCULAR; INTRAVENOUS; SOFT TISSUE at 03:06

## 2024-06-17 RX ADMIN — METHYLPREDNISOLONE ACETATE 40 MG: 40 INJECTION, SUSPENSION INTRA-ARTICULAR; INTRALESIONAL; INTRAMUSCULAR; SOFT TISSUE at 03:06

## 2024-06-17 NOTE — PROGRESS NOTES
New Clinic Note    Yokasta Segal is a 49 y.o. female     CC:   Chief Complaint   Patient presents with    Anxiety     Patient stated she was seen in the ER at [facility]  and then Charissa Amador DNP saw her next day and placed her on Fluoxetine 20 mg for severe anxiety. She is here for a follow up. Stated her Potassium was too low in ER and was given replacement. 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     Follow-up    Neck Pain     Has pain in neck, lower back and history of bulging disc and left knee pain and stated her knee will swell up and pop.  Had scope on left knee in the past back in 2010. Stated her mother and her sister both have problems with their knees.         Subjective    History of Present Illness HPI   Patient complains of knee and back pain for several weeks. Pain from back radiates into bilateral feet. She had surgery in 2010 on left knee by Dr. Johnson. She denies a recent injury. She is not taking anything for her symptoms. Patient is here to follow up on anxiety. She is tolerating the prozac without side effects. She says the medication is helping her anxiety.     Current Outpatient Medications:     amlodipine-valsartan (EXFORGE)  mg per tablet, Take 1 tablet by mouth once daily., Disp: 90 tablet, Rfl: 0    atorvastatin (LIPITOR) 10 MG tablet, Take 1 tablet (10 mg total) by mouth every evening., Disp: 90 tablet, Rfl: 0    b complex vitamins tablet, Take 1 tablet by mouth once daily., Disp: , Rfl:     ferrous sulfate (FEOSOL) 325 mg (65 mg iron) Tab tablet, Take 1 tablet (325 mg total) by mouth 3 (three) times daily., Disp: 270 tablet, Rfl: 1    FLUoxetine 20 MG capsule, Take 1 capsule (20 mg total) by mouth once daily., Disp: 30 capsule, Rfl: 1    potassium chloride (MICRO-K) 10 MEQ CpSR, Take 1 capsule (10 mEq total) by mouth once daily., Disp: 90 capsule, Rfl: 0    LORazepam (ATIVAN) 1 MG tablet, Take 1 mg by mouth., Disp: , Rfl:     naproxen (NAPROSYN) 500 MG tablet, Take 1 tablet  "(500 mg total) by mouth 2 (two) times daily as needed (pain)., Disp: 60 tablet, Rfl: 1     Past Medical History:   Diagnosis Date    Anxiety     Arthritis     Depression     Hyperlipidemia     Hypertension         Family History   Problem Relation Name Age of Onset    Cancer Mother      Gout Mother      Thyroid disease Mother      Cancer Father          Past Surgical History:   Procedure Laterality Date    Left Knee Surgery Left     TUBAL LIGATION          Review of Systems   Constitutional:  Negative for fatigue and fever.   HENT:  Negative for ear pain, postnasal drip, rhinorrhea and sinus pressure/congestion.    Respiratory:  Negative for cough and shortness of breath.    Cardiovascular:  Negative for chest pain.   Gastrointestinal:  Negative for abdominal pain, diarrhea, nausea and vomiting.   Genitourinary:  Negative for dysuria.   Neurological:  Negative for headaches.        /80 (BP Location: Left arm, Patient Position: Sitting, BP Method: Large (Automatic))   Pulse 87   Temp 100.1 °F (37.8 °C) (Oral)   Resp 18   Ht 5' 3.5" (1.613 m)   Wt 101.6 kg (224 lb)   LMP 05/28/2024 (Exact Date)   SpO2 100%   BMI 39.06 kg/m²      Physical Exam  HENT:      Head: Normocephalic and atraumatic.   Cardiovascular:      Rate and Rhythm: Normal rate and regular rhythm.   Pulmonary:      Effort: Pulmonary effort is normal.      Breath sounds: Normal breath sounds.   Neurological:      Mental Status: She is alert and oriented to person, place, and time.   Psychiatric:         Mood and Affect: Mood normal.         Behavior: Behavior normal.          Assessment and Plan      ICD-10-CM ICD-9-CM   1. Acute pain of left knee  M25.562 719.46   2. Acute low back pain with sciatica, sciatica laterality unspecified, unspecified back pain laterality  M54.40 724.2     724.3        1. Acute pain of left knee  X-rays shows arthritis. Will start on naproxen as needed. Steroid IM in clinic.   -     X-Ray Knee AP LAT with [place] " Left; Future; Expected date: 06/17/2024  -     methylPREDNISolone acetate injection 40 mg  -     dexAMETHasone injection 4 mg  -     naproxen (NAPROSYN) 500 MG tablet; Take 1 tablet (500 mg total) by mouth 2 (two) times daily as needed (pain).  Dispense: 60 tablet; Refill: 1    2. Acute low back pain with sciatica, sciatica laterality unspecified, unspecified back pain laterality  -     X-Ray Lumbar Spine 5 View; Future; Expected date: 06/17/2024  -     naproxen (NAPROSYN) 500 MG tablet; Take 1 tablet (500 mg total) by mouth 2 (two) times daily as needed (pain).  Dispense: 60 tablet; Refill: 1       Follow up if symptoms worsen or fail to improve.

## 2024-06-27 ENCOUNTER — TELEPHONE (OUTPATIENT)
Dept: FAMILY MEDICINE | Facility: CLINIC | Age: 49
End: 2024-06-27
Payer: COMMERCIAL

## 2024-06-27 NOTE — TELEPHONE ENCOUNTER
Jossy from [facility] called. She said we had sent over a referral due to abnormal PAP. She said if the HPV was positive they would see patient.  Since HPV was not done the we can either get her back in her to do one or just repeat her PAP in a year.She said if HPV is negative then patient will not have to have another PAP for 5 years.

## 2024-06-28 NOTE — TELEPHONE ENCOUNTER
I spoke with patient about this and she said she ended up going to [facility] in [place]. I will try to get a  of PAP for our cHART.

## 2024-08-29 DIAGNOSIS — I10 PRIMARY HYPERTENSION: ICD-10-CM

## 2024-08-29 DIAGNOSIS — E87.6 HYPOKALEMIA DUE TO EXCESSIVE RENAL LOSS OF POTASSIUM: ICD-10-CM

## 2024-08-29 DIAGNOSIS — E78.2 MIXED HYPERLIPIDEMIA: ICD-10-CM

## 2024-08-29 RX ORDER — POTASSIUM CHLORIDE 750 MG/1
10 CAPSULE, EXTENDED RELEASE ORAL
Qty: 90 CAPSULE | Refills: 0 | Status: SHIPPED | OUTPATIENT
Start: 2024-08-29

## 2024-08-29 RX ORDER — ATORVASTATIN CALCIUM 10 MG/1
10 TABLET, FILM COATED ORAL NIGHTLY
Qty: 90 TABLET | Refills: 0 | Status: SHIPPED | OUTPATIENT
Start: 2024-08-29

## 2024-08-29 RX ORDER — AMLODIPINE AND VALSARTAN 10; 320 MG/1; MG/1
1 TABLET ORAL
Qty: 90 TABLET | Refills: 0 | Status: SHIPPED | OUTPATIENT
Start: 2024-08-29

## 2024-09-06 ENCOUNTER — OFFICE VISIT (OUTPATIENT)
Dept: FAMILY MEDICINE | Facility: CLINIC | Age: 49
End: 2024-09-06
Payer: COMMERCIAL

## 2024-09-06 VITALS
SYSTOLIC BLOOD PRESSURE: 127 MMHG | TEMPERATURE: 98 F | WEIGHT: 218 LBS | DIASTOLIC BLOOD PRESSURE: 89 MMHG | HEIGHT: 64 IN | OXYGEN SATURATION: 98 % | HEART RATE: 80 BPM | RESPIRATION RATE: 18 BRPM | BODY MASS INDEX: 37.22 KG/M2

## 2024-09-06 DIAGNOSIS — F31.32 BIPOLAR AFFECTIVE DISORDER, CURRENTLY DEPRESSED, MODERATE: Primary | Chronic | ICD-10-CM

## 2024-09-06 RX ORDER — CARIPRAZINE 3 MG/1
3 CAPSULE, GELATIN COATED ORAL DAILY
Qty: 30 CAPSULE | Refills: 5 | Status: CANCELLED | OUTPATIENT
Start: 2024-09-06

## 2024-09-06 RX ORDER — CARIPRAZINE 1.5 MG/1
1.5 CAPSULE, GELATIN COATED ORAL DAILY
Qty: 30 CAPSULE | Refills: 0 | Status: SHIPPED | OUTPATIENT
Start: 2024-09-06

## 2024-09-06 NOTE — LETTER
September 6, 2024      [facility] [address] [place] [address]  Phone: [phone]  Fax: [phone]       Patient: Yokasta Segal   YOB: 1975  Date of Visit: 09/06/2024    To Whom It May Concern:    Modesto Segal  was at [facility] on 09/06/2024. The patient may return to work/school on 09/09/2024 with no restrictions. If you have any questions or concerns, or if I can be of further assistance, please do not hesitate to contact me.    Sincerely,    Savannah Chavez LPN

## 2024-09-06 NOTE — PROGRESS NOTES
New Clinic Note    Yokasta Segal is a 49 y.o. female     CC:   Chief Complaint   Patient presents with    Anxiety     Had a family member she was very close recently  commit suicide and she is upset and cannot quit crying. Stated she had several deaths with people in her [employer] and community and is sad. Had been taking Fluoxetine in the past and recently got back on this. Unable to go into work due to her depression and is asking for work excuse.     Depression        Subjective    History of Present Illness HPI   Patient complains of depression for several months. She reports that it got worse when her friend committed suicide a week ago. She has been taking Fluoxetine 20 mg with little relief. She reports that she has trouble eating and sleeping.     Current Outpatient Medications:     amlodipine-valsartan (EXFORGE)  mg per tablet, TAKE 1 TABLET BY MOUTH EVERY DAY, Disp: 90 tablet, Rfl: 0    atorvastatin (LIPITOR) 10 MG tablet, TAKE 1 TABLET BY MOUTH EVERY DAY IN THE EVENING, Disp: 90 tablet, Rfl: 0    b complex vitamins tablet, Take 1 tablet by mouth once daily., Disp: , Rfl:     ferrous sulfate (FEOSOL) 325 mg (65 mg iron) Tab tablet, Take 1 tablet (325 mg total) by mouth 3 (three) times daily., Disp: 270 tablet, Rfl: 1    naproxen (NAPROSYN) 500 MG tablet, Take 1 tablet (500 mg total) by mouth 2 (two) times daily as needed (pain)., Disp: 60 tablet, Rfl: 1    potassium chloride (MICRO-K) 10 MEQ CpSR, TAKE 1 CAPSULE BY MOUTH ONCE DAILY., Disp: 90 capsule, Rfl: 0    cariprazine (VRAYLAR) 1.5 mg Cap, Take 1 capsule (1.5 mg total) by mouth Daily., Disp: 30 capsule, Rfl: 0    LORazepam (ATIVAN) 1 MG tablet, Take 1 mg by mouth., Disp: , Rfl:      Past Medical History:   Diagnosis Date    Anxiety     Arthritis     Depression     Hyperlipidemia     Hypertension         Family History   Problem Relation Name Age of Onset    Cancer Mother      Gout Mother      Thyroid disease Mother      Cancer Father          Past  "Surgical History:   Procedure Laterality Date    Left Knee Surgery Left     TUBAL LIGATION          Review of Systems   Constitutional:  Positive for appetite change. Negative for fatigue and fever.   HENT:  Negative for ear pain, postnasal drip, rhinorrhea and sinus pressure/congestion.    Respiratory:  Negative for cough and shortness of breath.    Cardiovascular:  Negative for chest pain.   Gastrointestinal:  Negative for abdominal pain, diarrhea, nausea and vomiting.   Genitourinary:  Negative for dysuria.   Neurological:  Negative for headaches.   Psychiatric/Behavioral:  Positive for depressed mood and sleep disturbance.         /89 (BP Location: Left arm, Patient Position: Sitting, BP Method: Large (Automatic))   Pulse 80   Temp 98 °F (36.7 °C) (Oral)   Resp 18   Ht 5' 3.5" (1.613 m)   Wt 98.9 kg (218 lb)   LMP 09/05/2024   SpO2 98%   BMI 38.01 kg/m²      Physical Exam  HENT:      Head: Normocephalic and atraumatic.   Cardiovascular:      Rate and Rhythm: Normal rate and regular rhythm.   Pulmonary:      Effort: Pulmonary effort is normal.      Breath sounds: Normal breath sounds.   Neurological:      Mental Status: She is alert and oriented to person, place, and time.   Psychiatric:         Mood and Affect: Mood is depressed.         Behavior: Behavior normal.         Thought Content: Thought content does not include homicidal or suicidal ideation.          Assessment and Plan      ICD-10-CM ICD-9-CM   1. Bipolar affective disorder, currently depressed, moderate  F31.32 296.52        1. Bipolar affective disorder, currently depressed, moderate  Not controlled. Start Vraylar 1.5mg. Recheck in 2 weeks or sooner if needed.   -     cariprazine (VRAYLAR) 1.5 mg Cap; Take 1 capsule (1.5 mg total) by mouth Daily.  Dispense: 30 capsule; Refill: 0      Follow up in about 2 weeks (around 9/20/2024), or if symptoms worsen or fail to improve.       "

## 2024-09-20 ENCOUNTER — OFFICE VISIT (OUTPATIENT)
Dept: FAMILY MEDICINE | Facility: CLINIC | Age: 49
End: 2024-09-20
Payer: COMMERCIAL

## 2024-09-20 VITALS
HEIGHT: 64 IN | RESPIRATION RATE: 18 BRPM | OXYGEN SATURATION: 99 % | BODY MASS INDEX: 37.9 KG/M2 | TEMPERATURE: 98 F | WEIGHT: 222 LBS | DIASTOLIC BLOOD PRESSURE: 81 MMHG | HEART RATE: 109 BPM | SYSTOLIC BLOOD PRESSURE: 114 MMHG

## 2024-09-20 DIAGNOSIS — Z23 FLU VACCINE NEED: ICD-10-CM

## 2024-09-20 DIAGNOSIS — J06.9 UPPER RESPIRATORY TRACT INFECTION, UNSPECIFIED TYPE: ICD-10-CM

## 2024-09-20 DIAGNOSIS — F31.32 BIPOLAR AFFECTIVE DISORDER, CURRENTLY DEPRESSED, MODERATE: Chronic | ICD-10-CM

## 2024-09-20 DIAGNOSIS — R42 DIZZINESS: ICD-10-CM

## 2024-09-20 DIAGNOSIS — R07.9 CHEST PAIN, UNSPECIFIED TYPE: Primary | ICD-10-CM

## 2024-09-20 PROCEDURE — 93000 ELECTROCARDIOGRAM COMPLETE: CPT | Mod: ,,, | Performed by: FAMILY MEDICINE

## 2024-09-20 PROCEDURE — 4010F ACE/ARB THERAPY RXD/TAKEN: CPT | Mod: ,,, | Performed by: FAMILY MEDICINE

## 2024-09-20 PROCEDURE — 1159F MED LIST DOCD IN RCRD: CPT | Mod: ,,, | Performed by: FAMILY MEDICINE

## 2024-09-20 PROCEDURE — 1160F RVW MEDS BY RX/DR IN RCRD: CPT | Mod: ,,, | Performed by: FAMILY MEDICINE

## 2024-09-20 PROCEDURE — 3079F DIAST BP 80-89 MM HG: CPT | Mod: ,,, | Performed by: FAMILY MEDICINE

## 2024-09-20 PROCEDURE — G0008 ADMIN INFLUENZA VIRUS VAC: HCPCS | Mod: ,,, | Performed by: FAMILY MEDICINE

## 2024-09-20 PROCEDURE — 99214 OFFICE O/P EST MOD 30 MIN: CPT | Mod: 25,,, | Performed by: FAMILY MEDICINE

## 2024-09-20 PROCEDURE — 90656 IIV3 VACC NO PRSV 0.5 ML IM: CPT | Mod: ,,, | Performed by: FAMILY MEDICINE

## 2024-09-20 PROCEDURE — 3074F SYST BP LT 130 MM HG: CPT | Mod: ,,, | Performed by: FAMILY MEDICINE

## 2024-09-20 PROCEDURE — 3008F BODY MASS INDEX DOCD: CPT | Mod: ,,, | Performed by: FAMILY MEDICINE

## 2024-09-20 RX ORDER — CARIPRAZINE 3 MG/1
3 CAPSULE, GELATIN COATED ORAL DAILY
Qty: 30 CAPSULE | Refills: 5 | Status: SHIPPED | OUTPATIENT
Start: 2024-09-20

## 2024-09-20 RX ORDER — LORATADINE 10 MG/1
10 TABLET ORAL DAILY
Qty: 30 TABLET | Refills: 2 | Status: SHIPPED | OUTPATIENT
Start: 2024-09-20 | End: 2025-09-20

## 2024-09-20 RX ORDER — MECLIZINE HYDROCHLORIDE 25 MG/1
25 TABLET ORAL 3 TIMES DAILY PRN
Qty: 30 TABLET | Refills: 1 | Status: SHIPPED | OUTPATIENT
Start: 2024-09-20

## 2024-09-20 NOTE — PROGRESS NOTES
New Clinic Note    Yokasta Segal is a 49 y.o. female     CC:   Chief Complaint   Patient presents with    Depression     Patient state she's in for follow up after starting new medication. She thinks the dose needs to be increased.     Sinus Problem     Ears popping, sinus drainage and dizziness that started on yesterday. Denies fever, cough, chills, aches, and SOB.     Referral     Requesting a stress test referral.         Subjective    History of Present Illness HPI   Patient is here to follow up on her depression. She is tolerating the vraylar but she does not think it is strong enough. Patient complains of rhinorrhea and stopped up ears. Started yesterday. She has not taken anything for it. She complains of dizziness. Patient complains of occasional chest pain. She denies shortness of breath.     Current Outpatient Medications:     amlodipine-valsartan (EXFORGE)  mg per tablet, TAKE 1 TABLET BY MOUTH EVERY DAY, Disp: 90 tablet, Rfl: 0    atorvastatin (LIPITOR) 10 MG tablet, TAKE 1 TABLET BY MOUTH EVERY DAY IN THE EVENING, Disp: 90 tablet, Rfl: 0    b complex vitamins tablet, Take 1 tablet by mouth once daily., Disp: , Rfl:     ferrous sulfate (FEOSOL) 325 mg (65 mg iron) Tab tablet, Take 1 tablet (325 mg total) by mouth 3 (three) times daily., Disp: 270 tablet, Rfl: 1    naproxen (NAPROSYN) 500 MG tablet, Take 1 tablet (500 mg total) by mouth 2 (two) times daily as needed (pain)., Disp: 60 tablet, Rfl: 1    potassium chloride (MICRO-K) 10 MEQ CpSR, TAKE 1 CAPSULE BY MOUTH ONCE DAILY., Disp: 90 capsule, Rfl: 0    cariprazine (VRAYLAR) 3 mg Cap, Take 1 capsule (3 mg total) by mouth once daily., Disp: 30 capsule, Rfl: 5    loratadine (CLARITIN) 10 mg tablet, Take 1 tablet (10 mg total) by mouth once daily., Disp: 30 tablet, Rfl: 2    LORazepam (ATIVAN) 1 MG tablet, Take 1 mg by mouth., Disp: , Rfl:     meclizine (ANTIVERT) 25 mg tablet, Take 1 tablet (25 mg total) by mouth 3 (three) times daily as needed for  "Dizziness., Disp: 30 tablet, Rfl: 1     Past Medical History:   Diagnosis Date    Anxiety     Arthritis     Depression     Hyperlipidemia     Hypertension         Family History   Problem Relation Name Age of Onset    Cancer Mother      Gout Mother      Thyroid disease Mother      Cancer Father          Past Surgical History:   Procedure Laterality Date    Left Knee Surgery Left     TUBAL LIGATION          Review of Systems   Constitutional:  Negative for fatigue and fever.   HENT:  Positive for nasal congestion. Negative for ear pain, postnasal drip, rhinorrhea and sinus pressure/congestion.    Respiratory:  Negative for cough and shortness of breath.    Cardiovascular:  Positive for chest pain.   Gastrointestinal:  Negative for abdominal pain, diarrhea, nausea and vomiting.   Genitourinary:  Negative for dysuria.   Neurological:  Positive for dizziness. Negative for headaches.        /81 (BP Location: Left arm, Patient Position: Sitting, BP Method: Large (Automatic))   Pulse 109   Temp 98.4 °F (36.9 °C) (Oral)   Resp 18   Ht 5' 3.5" (1.613 m)   Wt 100.7 kg (222 lb)   LMP 09/05/2024   SpO2 99%   BMI 38.71 kg/m²      Physical Exam  HENT:      Head: Normocephalic and atraumatic.      Right Ear: Tympanic membrane, ear canal and external ear normal.      Left Ear: Tympanic membrane, ear canal and external ear normal.      Nose: Rhinorrhea present.   Cardiovascular:      Rate and Rhythm: Normal rate and regular rhythm.   Pulmonary:      Effort: Pulmonary effort is normal.      Breath sounds: Normal breath sounds.   Neurological:      Mental Status: She is alert and oriented to person, place, and time.   Psychiatric:         Mood and Affect: Mood normal.         Behavior: Behavior normal.          Assessment and Plan      ICD-10-CM ICD-9-CM   1. Chest pain, unspecified type  R07.9 786.50   2. Flu vaccine need  Z23 V04.81   3. Bipolar affective disorder, currently depressed, moderate  F31.32 296.52   4. " Upper respiratory tract infection, unspecified type  J06.9 465.9   5. Dizziness  R42 780.4        1. Chest pain, unspecified type  EKG - normal sinus rhythm. No ST elevation or depression.   -     POCT EKG 12-LEAD (Manually Resulted by Ordering Provider)    2. Flu vaccine need  -     influenza (Flulaval, Fluzone, Fluarix) 45 mcg/0.5 mL IM vaccine (> or = 6 mo) 0.5 mL    3. Bipolar affective disorder, currently depressed, moderate  Not controlled. Increase Vraylar to 3 mg.   -     cariprazine (VRAYLAR) 3 mg Cap; Take 1 capsule (3 mg total) by mouth once daily.  Dispense: 30 capsule; Refill: 5    4. Upper respiratory tract infection, unspecified type  -     loratadine (CLARITIN) 10 mg tablet; Take 1 tablet (10 mg total) by mouth once daily.  Dispense: 30 tablet; Refill: 2    5. Dizziness  -     meclizine (ANTIVERT) 25 mg tablet; Take 1 tablet (25 mg total) by mouth 3 (three) times daily as needed for Dizziness.  Dispense: 30 tablet; Refill: 1      Follow up in about 4 weeks (around 10/18/2024).

## 2024-09-26 DIAGNOSIS — M25.562 ACUTE PAIN OF LEFT KNEE: ICD-10-CM

## 2024-09-26 DIAGNOSIS — M54.40 ACUTE LOW BACK PAIN WITH SCIATICA, SCIATICA LATERALITY UNSPECIFIED, UNSPECIFIED BACK PAIN LATERALITY: ICD-10-CM

## 2024-09-29 RX ORDER — NAPROXEN 500 MG/1
TABLET ORAL
Qty: 60 TABLET | Refills: 1 | Status: SHIPPED | OUTPATIENT
Start: 2024-09-29

## 2024-10-10 ENCOUNTER — OFFICE VISIT (OUTPATIENT)
Dept: FAMILY MEDICINE | Facility: CLINIC | Age: 49
End: 2024-10-10
Payer: COMMERCIAL

## 2024-10-10 VITALS
OXYGEN SATURATION: 98 % | RESPIRATION RATE: 18 BRPM | WEIGHT: 232 LBS | DIASTOLIC BLOOD PRESSURE: 76 MMHG | SYSTOLIC BLOOD PRESSURE: 124 MMHG | TEMPERATURE: 99 F | HEIGHT: 64 IN | HEART RATE: 97 BPM | BODY MASS INDEX: 39.61 KG/M2

## 2024-10-10 DIAGNOSIS — F31.32 BIPOLAR AFFECTIVE DISORDER, CURRENTLY DEPRESSED, MODERATE: Primary | Chronic | ICD-10-CM

## 2024-10-10 DIAGNOSIS — R31.9 HEMATURIA, UNSPECIFIED TYPE: ICD-10-CM

## 2024-10-10 DIAGNOSIS — M54.50 LOW BACK PAIN WITH RADIATION: ICD-10-CM

## 2024-10-10 LAB
BILIRUB SERPL-MCNC: NEGATIVE MG/DL
BLOOD URINE, POC: ABNORMAL
CLARITY, UA: ABNORMAL
COLOR, UA: ABNORMAL
GLUCOSE UR QL STRIP: NEGATIVE
KETONES UR QL STRIP: ABNORMAL
LEUKOCYTE ESTERASE URINE, POC: ABNORMAL
NITRITE, POC UA: NEGATIVE
PH, POC UA: 5.5
PROTEIN, POC: 100
SPECIFIC GRAVITY, POC UA: 1.03
UROBILINOGEN, POC UA: 0.2

## 2024-10-10 PROCEDURE — 4010F ACE/ARB THERAPY RXD/TAKEN: CPT | Mod: ,,, | Performed by: FAMILY MEDICINE

## 2024-10-10 PROCEDURE — 3008F BODY MASS INDEX DOCD: CPT | Mod: ,,, | Performed by: FAMILY MEDICINE

## 2024-10-10 PROCEDURE — 99213 OFFICE O/P EST LOW 20 MIN: CPT | Mod: 25,,, | Performed by: FAMILY MEDICINE

## 2024-10-10 PROCEDURE — 3078F DIAST BP <80 MM HG: CPT | Mod: ,,, | Performed by: FAMILY MEDICINE

## 2024-10-10 PROCEDURE — 3074F SYST BP LT 130 MM HG: CPT | Mod: ,,, | Performed by: FAMILY MEDICINE

## 2024-10-10 PROCEDURE — 1160F RVW MEDS BY RX/DR IN RCRD: CPT | Mod: ,,, | Performed by: FAMILY MEDICINE

## 2024-10-10 PROCEDURE — 1159F MED LIST DOCD IN RCRD: CPT | Mod: ,,, | Performed by: FAMILY MEDICINE

## 2024-10-10 PROCEDURE — 96372 THER/PROPH/DIAG INJ SC/IM: CPT | Mod: ,,, | Performed by: FAMILY MEDICINE

## 2024-10-10 PROCEDURE — 81003 URINALYSIS AUTO W/O SCOPE: CPT | Mod: QW,,, | Performed by: FAMILY MEDICINE

## 2024-10-10 RX ORDER — KETOROLAC TROMETHAMINE 30 MG/ML
30 INJECTION, SOLUTION INTRAMUSCULAR; INTRAVENOUS
Status: COMPLETED | OUTPATIENT
Start: 2024-10-10 | End: 2024-10-10

## 2024-10-10 RX ORDER — DEXAMETHASONE SODIUM PHOSPHATE 4 MG/ML
4 INJECTION, SOLUTION INTRA-ARTICULAR; INTRALESIONAL; INTRAMUSCULAR; INTRAVENOUS; SOFT TISSUE
Status: COMPLETED | OUTPATIENT
Start: 2024-10-10 | End: 2024-10-10

## 2024-10-10 RX ORDER — METHYLPREDNISOLONE ACETATE 40 MG/ML
40 INJECTION, SUSPENSION INTRA-ARTICULAR; INTRALESIONAL; INTRAMUSCULAR; SOFT TISSUE
Status: COMPLETED | OUTPATIENT
Start: 2024-10-10 | End: 2024-10-10

## 2024-10-10 RX ADMIN — METHYLPREDNISOLONE ACETATE 40 MG: 40 INJECTION, SUSPENSION INTRA-ARTICULAR; INTRALESIONAL; INTRAMUSCULAR; SOFT TISSUE at 02:10

## 2024-10-10 RX ADMIN — KETOROLAC TROMETHAMINE 30 MG: 30 INJECTION, SOLUTION INTRAMUSCULAR; INTRAVENOUS at 02:10

## 2024-10-10 RX ADMIN — DEXAMETHASONE SODIUM PHOSPHATE 4 MG: 4 INJECTION, SOLUTION INTRA-ARTICULAR; INTRALESIONAL; INTRAMUSCULAR; INTRAVENOUS; SOFT TISSUE at 02:10

## 2024-10-10 NOTE — PROGRESS NOTES
New Clinic Note    Yokasta Segal is a 49 y.o. female     CC:   Chief Complaint   Patient presents with    Anxiety     Here for one month follow up after medication change.     Follow-up     Patient was started on a new prescription of Vraylar for anxiety. Tolerating this new Rx well. Needs a work excuse for today's visit. Complains of low back pain. Has h/o bulging disc and sees Dr Dominguez with [facility] in [place]. Asking for a shot due to her lower back pain. On her menstrual cycle today.         Subjective    History of Present Illness HPI   Patient is here for follow up after increasing Vraylar. She reports that her symptoms are controlled and she is tolerating the medication well. She complains of low back pain for 3 days. She sees Dr. Dominguez at [facility] for her low back pain. She is on her menstrual cycle today.     Current Outpatient Medications:     amlodipine-valsartan (EXFORGE)  mg per tablet, TAKE 1 TABLET BY MOUTH EVERY DAY, Disp: 90 tablet, Rfl: 0    atorvastatin (LIPITOR) 10 MG tablet, TAKE 1 TABLET BY MOUTH EVERY DAY IN THE EVENING, Disp: 90 tablet, Rfl: 0    b complex vitamins tablet, Take 1 tablet by mouth once daily., Disp: , Rfl:     cariprazine (VRAYLAR) 3 mg Cap, Take 1 capsule (3 mg total) by mouth once daily., Disp: 30 capsule, Rfl: 5    ferrous sulfate (FEOSOL) 325 mg (65 mg iron) Tab tablet, Take 1 tablet (325 mg total) by mouth 3 (three) times daily., Disp: 270 tablet, Rfl: 1    loratadine (CLARITIN) 10 mg tablet, Take 1 tablet (10 mg total) by mouth once daily., Disp: 30 tablet, Rfl: 2    meclizine (ANTIVERT) 25 mg tablet, Take 1 tablet (25 mg total) by mouth 3 (three) times daily as needed for Dizziness., Disp: 30 tablet, Rfl: 1    naproxen (NAPROSYN) 500 MG tablet, TAKE 1 TABLET BY MOUTH 2 TIMES DAILY AS NEEDED (PAIN)., Disp: 60 tablet, Rfl: 1    potassium chloride (MICRO-K) 10 MEQ CpSR, TAKE 1 CAPSULE BY MOUTH ONCE DAILY., Disp: 90 capsule, Rfl: 0  No current  "facility-administered medications for this visit.     Past Medical History:   Diagnosis Date    Anxiety     Arthritis     Depression     Hyperlipidemia     Hypertension         Family History   Problem Relation Name Age of Onset    Cancer Mother      Gout Mother      Thyroid disease Mother      Cancer Father          Past Surgical History:   Procedure Laterality Date    Left Knee Surgery Left     TUBAL LIGATION          Review of Systems     /76   Pulse 97   Temp 98.6 °F (37 °C) (Oral)   Resp 18   Ht 5' 3.5" (1.613 m)   Wt 105.2 kg (232 lb)   LMP 10/10/2024   SpO2 98%   BMI 40.45 kg/m²      Physical Exam     Assessment and Plan      ICD-10-CM ICD-9-CM   1. Bipolar affective disorder, currently depressed, moderate  F31.32 296.52   2. Low back pain with radiation  M54.50 724.2   3. Hematuria, unspecified type  R31.9 599.70        1. Bipolar affective disorder, currently depressed, moderate  The current medical regimen is effective;  continue present plan and medications.    2. Low back pain with radiation  Treat with steroid and toradol.   -     POCT URINALYSIS W/O SCOPE  -     methylPREDNISolone acetate injection 40 mg  -     dexAMETHasone injection 4 mg  -     ketorolac injection 30 mg  -     Urine culture    3. Hematuria, unspecified type  Send urine for a culture.   -     Urine culture        Follow up if symptoms worsen or fail to improve.       "

## 2024-10-10 NOTE — LETTER
October 10, 2024      [facility] [address] [place] [address]  Phone: [phone]  Fax: [phone]       Patient: Yokasta Segal   YOB: 1975  Date of Visit: 10/10/2024    To Whom It May Concern:    Modesto Segal  was at [facility] on 10/10/2024. The patient may return to work/school on 10/1/2024 with no restrictions. If you have any questions or concerns, or if I can be of further assistance, please do not hesitate to contact me.    Sincerely,    Savannah Chavez LPN

## 2024-10-12 LAB — UA COMPLETE W REFLEX CULTURE PNL UR: NO GROWTH

## 2024-11-06 ENCOUNTER — OFFICE VISIT (OUTPATIENT)
Dept: FAMILY MEDICINE | Facility: CLINIC | Age: 49
End: 2024-11-06
Payer: COMMERCIAL

## 2024-11-06 VITALS
RESPIRATION RATE: 18 BRPM | HEART RATE: 70 BPM | TEMPERATURE: 99 F | DIASTOLIC BLOOD PRESSURE: 80 MMHG | HEIGHT: 64 IN | BODY MASS INDEX: 39.95 KG/M2 | WEIGHT: 234 LBS | SYSTOLIC BLOOD PRESSURE: 128 MMHG | OXYGEN SATURATION: 98 %

## 2024-11-06 DIAGNOSIS — E87.6 HYPOKALEMIA DUE TO EXCESSIVE RENAL LOSS OF POTASSIUM: ICD-10-CM

## 2024-11-06 DIAGNOSIS — D50.8 IRON DEFICIENCY ANEMIA SECONDARY TO INADEQUATE DIETARY IRON INTAKE: ICD-10-CM

## 2024-11-06 DIAGNOSIS — F31.32 BIPOLAR AFFECTIVE DISORDER, CURRENTLY DEPRESSED, MODERATE: Chronic | ICD-10-CM

## 2024-11-06 DIAGNOSIS — I10 PRIMARY HYPERTENSION: ICD-10-CM

## 2024-11-06 DIAGNOSIS — J06.9 UPPER RESPIRATORY TRACT INFECTION, UNSPECIFIED TYPE: ICD-10-CM

## 2024-11-06 DIAGNOSIS — E78.2 MIXED HYPERLIPIDEMIA: ICD-10-CM

## 2024-11-06 LAB
ANION GAP SERPL CALCULATED.3IONS-SCNC: 13 MMOL/L (ref 7–16)
BASOPHILS # BLD AUTO: 0.11 K/UL (ref 0–0.2)
BASOPHILS NFR BLD AUTO: 1.5 % (ref 0–1)
BUN SERPL-MCNC: 9 MG/DL (ref 7–18)
BUN/CREAT SERPL: 11 (ref 6–20)
CALCIUM SERPL-MCNC: 9.3 MG/DL (ref 8.5–10.1)
CHLORIDE SERPL-SCNC: 107 MMOL/L (ref 98–107)
CO2 SERPL-SCNC: 26 MMOL/L (ref 21–32)
CREAT SERPL-MCNC: 0.79 MG/DL (ref 0.55–1.02)
DIFFERENTIAL METHOD BLD: ABNORMAL
EGFR (NO RACE VARIABLE) (RUSH/TITUS): 91 ML/MIN/1.73M2
EOSINOPHIL # BLD AUTO: 0.07 K/UL (ref 0–0.5)
EOSINOPHIL NFR BLD AUTO: 0.9 % (ref 1–4)
ERYTHROCYTE [DISTWIDTH] IN BLOOD BY AUTOMATED COUNT: 14.3 % (ref 11.5–14.5)
GLUCOSE SERPL-MCNC: 79 MG/DL (ref 74–106)
HCT VFR BLD AUTO: 42.1 % (ref 38–47)
HGB BLD-MCNC: 12.5 G/DL (ref 12–16)
IMM GRANULOCYTES # BLD AUTO: 0.02 K/UL (ref 0–0.04)
IMM GRANULOCYTES NFR BLD: 0.3 % (ref 0–0.4)
LYMPHOCYTES # BLD AUTO: 1.65 K/UL (ref 1–4.8)
LYMPHOCYTES NFR BLD AUTO: 22 % (ref 27–41)
MCH RBC QN AUTO: 29.1 PG (ref 27–31)
MCHC RBC AUTO-ENTMCNC: 29.7 G/DL (ref 32–36)
MCV RBC AUTO: 98.1 FL (ref 80–96)
MONOCYTES # BLD AUTO: 0.6 K/UL (ref 0–0.8)
MONOCYTES NFR BLD AUTO: 8 % (ref 2–6)
MPC BLD CALC-MCNC: 11.3 FL (ref 9.4–12.4)
NEUTROPHILS # BLD AUTO: 5.04 K/UL (ref 1.8–7.7)
NEUTROPHILS NFR BLD AUTO: 67.3 % (ref 53–65)
NRBC # BLD AUTO: 0 X10E3/UL
NRBC, AUTO (.00): 0 %
PLATELET # BLD AUTO: 380 K/UL (ref 150–400)
POTASSIUM SERPL-SCNC: 4 MMOL/L (ref 3.5–5.1)
RBC # BLD AUTO: 4.29 M/UL (ref 4.2–5.4)
SODIUM SERPL-SCNC: 142 MMOL/L (ref 136–145)
WBC # BLD AUTO: 7.49 K/UL (ref 4.5–11)

## 2024-11-06 PROCEDURE — 1159F MED LIST DOCD IN RCRD: CPT | Mod: ,,, | Performed by: FAMILY MEDICINE

## 2024-11-06 PROCEDURE — 85025 COMPLETE CBC W/AUTO DIFF WBC: CPT | Mod: ,,, | Performed by: CLINICAL MEDICAL LABORATORY

## 2024-11-06 PROCEDURE — 99214 OFFICE O/P EST MOD 30 MIN: CPT | Mod: ,,, | Performed by: FAMILY MEDICINE

## 2024-11-06 PROCEDURE — 3074F SYST BP LT 130 MM HG: CPT | Mod: ,,, | Performed by: FAMILY MEDICINE

## 2024-11-06 PROCEDURE — 4010F ACE/ARB THERAPY RXD/TAKEN: CPT | Mod: ,,, | Performed by: FAMILY MEDICINE

## 2024-11-06 PROCEDURE — 1160F RVW MEDS BY RX/DR IN RCRD: CPT | Mod: ,,, | Performed by: FAMILY MEDICINE

## 2024-11-06 PROCEDURE — 3079F DIAST BP 80-89 MM HG: CPT | Mod: ,,, | Performed by: FAMILY MEDICINE

## 2024-11-06 PROCEDURE — 80048 BASIC METABOLIC PNL TOTAL CA: CPT | Mod: ,,, | Performed by: CLINICAL MEDICAL LABORATORY

## 2024-11-06 PROCEDURE — 3008F BODY MASS INDEX DOCD: CPT | Mod: ,,, | Performed by: FAMILY MEDICINE

## 2024-11-06 RX ORDER — CARIPRAZINE 3 MG/1
3 CAPSULE, GELATIN COATED ORAL DAILY
Qty: 90 CAPSULE | Refills: 1 | Status: SHIPPED | OUTPATIENT
Start: 2024-11-06

## 2024-11-06 RX ORDER — ATORVASTATIN CALCIUM 10 MG/1
10 TABLET, FILM COATED ORAL NIGHTLY
Qty: 90 TABLET | Refills: 1 | Status: SHIPPED | OUTPATIENT
Start: 2024-11-06

## 2024-11-06 RX ORDER — AMLODIPINE AND VALSARTAN 10; 320 MG/1; MG/1
1 TABLET ORAL DAILY
Qty: 90 TABLET | Refills: 1 | Status: SHIPPED | OUTPATIENT
Start: 2024-11-06

## 2024-11-06 RX ORDER — FERROUS SULFATE 325(65) MG
325 TABLET ORAL 3 TIMES DAILY
Qty: 270 TABLET | Refills: 1 | Status: SHIPPED | OUTPATIENT
Start: 2024-11-06

## 2024-11-06 RX ORDER — POTASSIUM CHLORIDE 750 MG/1
10 CAPSULE, EXTENDED RELEASE ORAL DAILY
Qty: 90 CAPSULE | Refills: 1 | Status: SHIPPED | OUTPATIENT
Start: 2024-11-06

## 2024-11-06 RX ORDER — LORATADINE 10 MG/1
10 TABLET ORAL DAILY
Qty: 30 TABLET | Refills: 2 | Status: SHIPPED | OUTPATIENT
Start: 2024-11-06 | End: 2025-11-06

## 2024-11-06 NOTE — LETTER
November 6, 2024      [facility] [address] [place] [address]  Phone: [phone]  Fax: [phone]       Patient: Yokasta Segal   YOB: 1975  Date of Visit: 11/06/2024    To Whom It May Concern:    Modesto Segal  was at [facility] on 11/06/2024. The patient may return to work/school on 11/06/2024 with no restrictions. If you have any questions or concerns, or if I can be of further assistance, please do not hesitate to contact me.    Sincerely,    Annelise Chris RN/Dr Malena Alejo

## 2024-11-06 NOTE — PROGRESS NOTES
"New Clinic Note    Yokasta Segal is a 49 y.o. female     CC:   Chief Complaint   Patient presents with    Hyperlipidemia     Patient is here for her 6 month check up, renewal of prescription and is "fasting" for labs.     Hypertension    Medication Refill               Subjective    History of Present Illness HPI   Patient is for evaluation of chronic medical problems. Patient needs refills. Yokasta  is tolerating medications well without side effects.       Current Outpatient Medications:     b complex vitamins tablet, Take 1 tablet by mouth once daily., Disp: , Rfl:     meclizine (ANTIVERT) 25 mg tablet, Take 1 tablet (25 mg total) by mouth 3 (three) times daily as needed for Dizziness., Disp: 30 tablet, Rfl: 1    naproxen (NAPROSYN) 500 MG tablet, TAKE 1 TABLET BY MOUTH 2 TIMES DAILY AS NEEDED (PAIN)., Disp: 60 tablet, Rfl: 1    amlodipine-valsartan (EXFORGE)  mg per tablet, Take 1 tablet by mouth once daily., Disp: 90 tablet, Rfl: 1    atorvastatin (LIPITOR) 10 MG tablet, Take 1 tablet (10 mg total) by mouth every evening., Disp: 90 tablet, Rfl: 1    cariprazine (VRAYLAR) 3 mg Cap, Take 1 capsule (3 mg total) by mouth once daily., Disp: 90 capsule, Rfl: 1    ferrous sulfate (FEOSOL) 325 mg (65 mg iron) Tab tablet, Take 1 tablet (325 mg total) by mouth 3 (three) times daily., Disp: 270 tablet, Rfl: 1    loratadine (CLARITIN) 10 mg tablet, Take 1 tablet (10 mg total) by mouth once daily., Disp: 30 tablet, Rfl: 2    potassium chloride (MICRO-K) 10 MEQ CpSR, Take 1 capsule (10 mEq total) by mouth once daily., Disp: 90 capsule, Rfl: 1     Past Medical History:   Diagnosis Date    Anxiety     Arthritis     Depression     Hyperlipidemia     Hypertension         Family History   Problem Relation Name Age of Onset    Cancer Mother      Gout Mother      Thyroid disease Mother      Cancer Father          Past Surgical History:   Procedure Laterality Date    Left Knee Surgery Left     TUBAL LIGATION          Review of " "Systems   Constitutional:  Negative for fatigue and fever.   HENT:  Negative for ear pain, postnasal drip, rhinorrhea and sinus pressure/congestion.    Respiratory:  Negative for cough and shortness of breath.    Cardiovascular:  Negative for chest pain.   Gastrointestinal:  Negative for abdominal pain, diarrhea, nausea and vomiting.   Genitourinary:  Negative for dysuria.   Neurological:  Negative for headaches.        /80 (BP Location: Left arm, Patient Position: Sitting)   Pulse 70   Temp 98.8 °F (37.1 °C) (Oral)   Resp 18   Ht 5' 3.6" (1.615 m)   Wt 106.1 kg (234 lb)   LMP 10/10/2024   SpO2 98%   BMI 40.67 kg/m²      Physical Exam  HENT:      Head: Normocephalic and atraumatic.   Cardiovascular:      Rate and Rhythm: Normal rate and regular rhythm.   Pulmonary:      Effort: Pulmonary effort is normal.      Breath sounds: Normal breath sounds.   Neurological:      Mental Status: She is alert and oriented to person, place, and time.   Psychiatric:         Mood and Affect: Mood normal.         Behavior: Behavior normal.          Assessment and Plan      ICD-10-CM ICD-9-CM   1. Primary hypertension  I10 401.9   2. Mixed hyperlipidemia  E78.2 272.2   3. Bipolar affective disorder, currently depressed, moderate  F31.32 296.52   4. Iron deficiency anemia secondary to inadequate dietary iron intake  D50.8 280.1   5. Upper respiratory tract infection, unspecified type  J06.9 465.9   6. Hypokalemia due to excessive renal loss of potassium  E87.6 276.8        1. Primary hypertension  The current medical regimen is effective;  continue present plan and medications.  -     amlodipine-valsartan (EXFORGE)  mg per tablet; Take 1 tablet by mouth once daily.  Dispense: 90 tablet; Refill: 1    2. Mixed hyperlipidemia  The current medical regimen is effective;  continue present plan and medications.  -     atorvastatin (LIPITOR) 10 MG tablet; Take 1 tablet (10 mg total) by mouth every evening.  Dispense: 90 " tablet; Refill: 1    3. Bipolar affective disorder, currently depressed, moderate  The current medical regimen is effective;  continue present plan and medications.  -     cariprazine (VRAYLAR) 3 mg Cap; Take 1 capsule (3 mg total) by mouth once daily.  Dispense: 90 capsule; Refill: 1    4. Iron deficiency anemia secondary to inadequate dietary iron intake  The current medical regimen is effective;  continue present plan and medications.  -     ferrous sulfate (FEOSOL) 325 mg (65 mg iron) Tab tablet; Take 1 tablet (325 mg total) by mouth 3 (three) times daily.  Dispense: 270 tablet; Refill: 1  -     CBC Auto Differential; Future; Expected date: 11/06/2024    5. Upper respiratory tract infection, unspecified type  -     loratadine (CLARITIN) 10 mg tablet; Take 1 tablet (10 mg total) by mouth once daily.  Dispense: 30 tablet; Refill: 2    6. Hypokalemia due to excessive renal loss of potassium  The current medical regimen is effective;  continue present plan and medications.  -     potassium chloride (MICRO-K) 10 MEQ CpSR; Take 1 capsule (10 mEq total) by mouth once daily.  Dispense: 90 capsule; Refill: 1  -     Basic Metabolic Panel; Future; Expected date: 11/06/2024         Follow up in about 6 months (around 5/6/2025).

## 2024-11-07 ENCOUNTER — TELEPHONE (OUTPATIENT)
Dept: FAMILY MEDICINE | Facility: CLINIC | Age: 49
End: 2024-11-07
Payer: COMMERCIAL

## 2024-11-07 NOTE — TELEPHONE ENCOUNTER
----- Message from Malena Alejo MD sent at 11/7/2024  1:36 PM CST -----  Regarding: RE: pt concern  We can add an LFT if she wants. Her liver enzymes were checked 6 months ago and were not elevated.  ----- Message -----  From: Annelise Chris RN  Sent: 11/7/2024   8:25 AM CST  To: Malena Alejo MD  Subject: FW: pt concern                                   Had BMP instead of CMP. Please advise.  ----- Message -----  From: Lisa Bhatti  Sent: 11/6/2024   9:43 AM CST  To: Sapna CHISHOLM Staff  Subject: pt concern                                       Pt is asking with the labs she just had done if her enzymes could be checked also.  Patient stated she was satisfied with her last three CMP showing no elevated LFT. Offered to do any way but she stated no it was fine. Just needed peace of mind due to H/O cancer in her family.

## 2025-03-03 ENCOUNTER — OFFICE VISIT (OUTPATIENT)
Dept: FAMILY MEDICINE | Facility: CLINIC | Age: 50
End: 2025-03-03
Payer: COMMERCIAL

## 2025-03-03 VITALS
OXYGEN SATURATION: 100 % | WEIGHT: 232.19 LBS | TEMPERATURE: 99 F | RESPIRATION RATE: 14 BRPM | HEART RATE: 74 BPM | SYSTOLIC BLOOD PRESSURE: 130 MMHG | HEIGHT: 64 IN | DIASTOLIC BLOOD PRESSURE: 87 MMHG | BODY MASS INDEX: 39.64 KG/M2

## 2025-03-03 DIAGNOSIS — D50.8 IRON DEFICIENCY ANEMIA SECONDARY TO INADEQUATE DIETARY IRON INTAKE: ICD-10-CM

## 2025-03-03 DIAGNOSIS — G89.29 CHRONIC LEFT-SIDED LOW BACK PAIN WITH LEFT-SIDED SCIATICA: ICD-10-CM

## 2025-03-03 DIAGNOSIS — Z00.00 ENCOUNTER FOR SUBSEQUENT ANNUAL WELLNESS VISIT (AWV) IN MEDICARE PATIENT: Primary | ICD-10-CM

## 2025-03-03 DIAGNOSIS — M54.42 CHRONIC LEFT-SIDED LOW BACK PAIN WITH LEFT-SIDED SCIATICA: ICD-10-CM

## 2025-03-03 DIAGNOSIS — E78.2 MIXED HYPERLIPIDEMIA: ICD-10-CM

## 2025-03-03 DIAGNOSIS — I10 PRIMARY HYPERTENSION: ICD-10-CM

## 2025-03-03 DIAGNOSIS — E87.6 HYPOKALEMIA DUE TO EXCESSIVE RENAL LOSS OF POTASSIUM: ICD-10-CM

## 2025-03-03 DIAGNOSIS — F31.32 BIPOLAR AFFECTIVE DISORDER, CURRENTLY DEPRESSED, MODERATE: ICD-10-CM

## 2025-03-03 DIAGNOSIS — R42 DIZZINESS: ICD-10-CM

## 2025-03-03 DIAGNOSIS — R26.9 ABNORMALITY OF GAIT AND MOBILITY: ICD-10-CM

## 2025-03-03 PROCEDURE — 1159F MED LIST DOCD IN RCRD: CPT | Mod: ,,, | Performed by: FAMILY MEDICINE

## 2025-03-03 PROCEDURE — 3079F DIAST BP 80-89 MM HG: CPT | Mod: ,,, | Performed by: FAMILY MEDICINE

## 2025-03-03 PROCEDURE — 3075F SYST BP GE 130 - 139MM HG: CPT | Mod: ,,, | Performed by: FAMILY MEDICINE

## 2025-03-03 PROCEDURE — G0439 PPPS, SUBSEQ VISIT: HCPCS | Mod: ,,, | Performed by: FAMILY MEDICINE

## 2025-03-03 RX ORDER — AMLODIPINE AND VALSARTAN 10; 320 MG/1; MG/1
1 TABLET ORAL DAILY
Qty: 90 TABLET | Refills: 1 | Status: SHIPPED | OUTPATIENT
Start: 2025-03-03

## 2025-03-03 RX ORDER — MECLIZINE HYDROCHLORIDE 25 MG/1
25 TABLET ORAL 3 TIMES DAILY PRN
Qty: 30 TABLET | Refills: 1 | Status: SHIPPED | OUTPATIENT
Start: 2025-03-03

## 2025-03-03 RX ORDER — CARIPRAZINE 3 MG/1
3 CAPSULE, GELATIN COATED ORAL DAILY
Qty: 90 CAPSULE | Refills: 1 | Status: CANCELLED | OUTPATIENT
Start: 2025-03-03

## 2025-03-03 RX ORDER — CARIPRAZINE 4.5 MG/1
4.5 CAPSULE, GELATIN COATED ORAL DAILY
Qty: 30 CAPSULE | Refills: 5 | Status: SHIPPED | OUTPATIENT
Start: 2025-03-03

## 2025-03-03 RX ORDER — POTASSIUM CHLORIDE 750 MG/1
10 CAPSULE, EXTENDED RELEASE ORAL DAILY
Qty: 90 CAPSULE | Refills: 1 | Status: SHIPPED | OUTPATIENT
Start: 2025-03-03

## 2025-03-03 RX ORDER — ATORVASTATIN CALCIUM 10 MG/1
10 TABLET, FILM COATED ORAL NIGHTLY
Qty: 90 TABLET | Refills: 1 | Status: SHIPPED | OUTPATIENT
Start: 2025-03-03

## 2025-03-03 NOTE — PROGRESS NOTES
"  Yokasta Segal presented for a follow-up Medicare AWV today. The following components were reviewed and updated:    Medical history  Family History  Social history  Allergies and Current Medications  Health Risk Assessment  Health Maintenance  Care Team    **See Completed Assessments for Annual Wellness visit with in the encounter summary    The following assessments were completed:  Depression Screening  Cognitive function Screening  Timed Get Up Test  Whisper Test      Opioid documentation:      Patient does not have a current opioid prescription.          Vitals:    03/03/25 1536   BP: 130/87   Pulse: 74   Resp: 14   Temp: 98.5 °F (36.9 °C)   SpO2: 100%   Weight: 105.3 kg (232 lb 3.2 oz)   Height: 5' 4" (1.626 m)     Body mass index is 39.86 kg/m².       Physical Exam  Constitutional:       Appearance: Normal appearance.   Cardiovascular:      Rate and Rhythm: Normal rate and regular rhythm.   Pulmonary:      Effort: Pulmonary effort is normal.      Breath sounds: Normal breath sounds.   Neurological:      General: No focal deficit present.      Mental Status: She is alert and oriented to person, place, and time.   Psychiatric:         Mood and Affect: Mood normal. Affect is tearful.         Behavior: Behavior normal.           Diagnoses and health risks identified today and associated recommendations/orders:  1. Encounter for subsequent annual wellness visit (AWV) in Medicare patient    2. Mixed hyperlipidemia  The current medical regimen is effective;  continue present plan and medications.  - atorvastatin (LIPITOR) 10 MG tablet; Take 1 tablet (10 mg total) by mouth every evening.  Dispense: 90 tablet; Refill: 1    3. Primary hypertension  The current medical regimen is effective;  continue present plan and medications.  - amlodipine-valsartan (EXFORGE)  mg per tablet; Take 1 tablet by mouth once daily.  Dispense: 90 tablet; Refill: 1    4. Bipolar affective disorder, currently depressed, moderate  Not " controlled. Increase Vraylar to 4.5 mg . Refer patient to a counselor. Follow up in 3 weeks.   - cariprazine (VRAYLAR) 4.5 mg Cap; Take 1 capsule (4.5 mg total) by mouth once daily.  Dispense: 30 capsule; Refill: 5    5. Abnormality of gait and mobility    6. Hypokalemia due to excessive renal loss of potassium  The current medical regimen is effective;  continue present plan and medications.  - potassium chloride (MICRO-K) 10 MEQ CpSR; Take 1 capsule (10 mEq total) by mouth once daily.  Dispense: 90 capsule; Refill: 1    7. Dizziness  - meclizine (ANTIVERT) 25 mg tablet; Take 1 tablet (25 mg total) by mouth 3 (three) times daily as needed for Dizziness.  Dispense: 30 tablet; Refill: 1    8. Iron deficiency anemia secondary to inadequate dietary iron intake  Stable.     9. Chronic left-sided low back pain with left-sided sciatica  Stable. Continue current meds.       Provided Yokasta with a 5-10 year written screening schedule and personal prevention plan. Recommendations were developed using the USPSTF age appropriate recommendations. Education, counseling, and referrals were provided as needed.  After Visit Summary printed and given to patient which includes a list of additional screenings\tests needed.    Follow up for 1 year for Annual Wellness Visit.      Malena Alejo MD      I offered to discuss advanced care planning, including how to pick a person who would make decisions for you if you were unable to make them for yourself, called a health care power of , and what kind of decisions you might make such as use of life sustaining treatments such as ventilators and tube feeding when faced with a life limiting illness recorded on a living will that they will need to know. (How you want to be cared for as you near the end of your natural life)     X Patient is interested in learning more about how to make advanced directives.  I provided them paperwork and offered to discuss this with them.

## 2025-03-03 NOTE — PATIENT INSTRUCTIONS
Counseling and Referral of Other Preventative  (Italic type indicates deductible and co-insurance are waived)    Patient Name: Yokasta Segal  Today's Date: 3/3/2025    Health Maintenance       Date Due Completion Date    HIV Screening Never done ---    TETANUS VACCINE Never done ---    Shingles Vaccine (1 of 2) Never done ---    Pneumococcal Vaccines (Age 50+) (1 of 1 - PCV) Never done ---    COVID-19 Vaccine (4 - 2024-25 season) 09/01/2024 12/3/2021    Mammogram 08/22/2025 8/22/2024    Hemoglobin A1c (Diabetic Prevention Screening) 10/13/2026 10/13/2023    Cervical Cancer Screening 05/06/2027 5/6/2024    Lipid Panel 05/06/2029 5/6/2024    Colorectal Cancer Screening 07/11/2033 7/11/2023    RSV Vaccine (Age 60+ and Pregnant patients) (1 - 1-dose 75+ series) 04/17/2048 ---        No orders of the defined types were placed in this encounter.    The following information is provided to all patients.  This information is to help you find resources for any of the problems found today that may be affecting your health:                  Living healthy guide: [place].gov    Understanding Diabetes: www.diabetes.org      Eating healthy: www.cdc.gov/healthyweight      CDC home safety checklist: www.cdc.gov/steadi/patient.html      Agency on Aging: [place].gov    Alcoholics anonymous (AA): www.aa.org      Physical Activity: www.karyna.nih.gov/gs9ynpk      Tobacco use: [place].gov

## 2025-04-03 ENCOUNTER — HOSPITAL ENCOUNTER (OUTPATIENT)
Dept: RADIOLOGY | Facility: HOSPITAL | Age: 50
Discharge: HOME | End: 2025-04-03
Attending: FAMILY MEDICINE
Payer: COMMERCIAL

## 2025-04-03 ENCOUNTER — OFFICE VISIT (OUTPATIENT)
Dept: FAMILY MEDICINE | Facility: CLINIC | Age: 50
End: 2025-04-03
Payer: COMMERCIAL

## 2025-04-03 VITALS
DIASTOLIC BLOOD PRESSURE: 86 MMHG | OXYGEN SATURATION: 100 % | HEART RATE: 63 BPM | BODY MASS INDEX: 39.44 KG/M2 | RESPIRATION RATE: 18 BRPM | SYSTOLIC BLOOD PRESSURE: 135 MMHG | WEIGHT: 231 LBS | TEMPERATURE: 99 F | HEIGHT: 64 IN

## 2025-04-03 DIAGNOSIS — R23.2 HOT FLASHES: ICD-10-CM

## 2025-04-03 DIAGNOSIS — F31.32 BIPOLAR AFFECTIVE DISORDER, CURRENTLY DEPRESSED, MODERATE: Chronic | ICD-10-CM

## 2025-04-03 DIAGNOSIS — G89.29 CHRONIC PAIN OF RIGHT KNEE: ICD-10-CM

## 2025-04-03 DIAGNOSIS — G89.29 CHRONIC PAIN OF RIGHT KNEE: Primary | ICD-10-CM

## 2025-04-03 DIAGNOSIS — M25.561 CHRONIC PAIN OF RIGHT KNEE: Primary | ICD-10-CM

## 2025-04-03 DIAGNOSIS — M25.561 CHRONIC PAIN OF RIGHT KNEE: ICD-10-CM

## 2025-04-03 PROCEDURE — 3079F DIAST BP 80-89 MM HG: CPT | Mod: ,,, | Performed by: FAMILY MEDICINE

## 2025-04-03 PROCEDURE — 4010F ACE/ARB THERAPY RXD/TAKEN: CPT | Mod: ,,, | Performed by: FAMILY MEDICINE

## 2025-04-03 PROCEDURE — 73562 X-RAY EXAM OF KNEE 3: CPT | Mod: 26,RT,, | Performed by: RADIOLOGY

## 2025-04-03 PROCEDURE — 1160F RVW MEDS BY RX/DR IN RCRD: CPT | Mod: ,,, | Performed by: FAMILY MEDICINE

## 2025-04-03 PROCEDURE — 73562 X-RAY EXAM OF KNEE 3: CPT | Mod: TC,PN,RT

## 2025-04-03 PROCEDURE — 1159F MED LIST DOCD IN RCRD: CPT | Mod: ,,, | Performed by: FAMILY MEDICINE

## 2025-04-03 PROCEDURE — 3075F SYST BP GE 130 - 139MM HG: CPT | Mod: ,,, | Performed by: FAMILY MEDICINE

## 2025-04-03 PROCEDURE — 96372 THER/PROPH/DIAG INJ SC/IM: CPT | Mod: ,,, | Performed by: FAMILY MEDICINE

## 2025-04-03 PROCEDURE — 99214 OFFICE O/P EST MOD 30 MIN: CPT | Mod: 25,,, | Performed by: FAMILY MEDICINE

## 2025-04-03 PROCEDURE — 3008F BODY MASS INDEX DOCD: CPT | Mod: ,,, | Performed by: FAMILY MEDICINE

## 2025-04-03 RX ORDER — NAPROXEN 500 MG/1
500 TABLET ORAL 2 TIMES DAILY PRN
Qty: 60 TABLET | Refills: 2 | Status: SHIPPED | OUTPATIENT
Start: 2025-04-03

## 2025-04-03 RX ORDER — METHYLPREDNISOLONE ACETATE 40 MG/ML
40 INJECTION, SUSPENSION INTRA-ARTICULAR; INTRALESIONAL; INTRAMUSCULAR; SOFT TISSUE
Status: COMPLETED | OUTPATIENT
Start: 2025-04-03 | End: 2025-04-03

## 2025-04-03 RX ORDER — DEXAMETHASONE SODIUM PHOSPHATE 4 MG/ML
4 INJECTION, SOLUTION INTRA-ARTICULAR; INTRALESIONAL; INTRAMUSCULAR; INTRAVENOUS; SOFT TISSUE
Status: COMPLETED | OUTPATIENT
Start: 2025-04-03 | End: 2025-04-03

## 2025-04-03 RX ADMIN — METHYLPREDNISOLONE ACETATE 40 MG: 40 INJECTION, SUSPENSION INTRA-ARTICULAR; INTRALESIONAL; INTRAMUSCULAR; SOFT TISSUE at 02:04

## 2025-04-03 RX ADMIN — DEXAMETHASONE SODIUM PHOSPHATE 4 MG: 4 INJECTION, SOLUTION INTRA-ARTICULAR; INTRALESIONAL; INTRAMUSCULAR; INTRAVENOUS; SOFT TISSUE at 02:04

## 2025-04-03 NOTE — PROGRESS NOTES
"New Clinic Note    Yokasta Segal is a 49 y.o. female     CC:   Chief Complaint   Patient presents with    Anxiety     Was told we were going to set her up for counselor for her depression but has not heard about this referral yet.     Follow-up     Patient is here for 1 month follow up for her anxiety and depression. Her medication was changed to vraylar. Can tell a difference and stated it is working well without side effects. Has lump in right breast she gets mammogram every year to be checked. Hurts in both knees . 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 Has swelling of ankles but no pitting edema. Having "hot flashes". Monthy menses is irregular and having "hot flashes". FH of breast cancer.    Knee Pain     H/O surgery in 2010 on left knee and sits a lot and stated both her knees are swollen and painful. Rates her pain as 10/10 on pain scale. Last Xray has been a while. Wants the PCP to examine before Xray's are ordered.     Health Maintenance     HIV Screening refused  TETANUS VACCINE refused  Shingles Vaccine(1 of 2) refused  Pneumococcal Vaccines (Age 50+)(1 of 1 - PCV) refused  COVID-19 Vaccine(4 - 2024-25 season) refused        Subjective    History of Present Illness HPI   Patient is here to follow up on Depression. She is doing well on her medications. She would like to see a counselor. She complains of chronic bilateral knee pain. She complains of irregular cycles and hot flashes.   Current Medications[1]     Past Medical History:   Diagnosis Date    Anxiety     Arthritis     Depression     Hyperlipidemia     Hypertension         Family History   Problem Relation Name Age of Onset    Cancer Mother      Gout Mother      Thyroid disease Mother      Cancer Father          Past Surgical History:   Procedure Laterality Date    Left Knee Surgery Left     TUBAL LIGATION          Review of Systems   Constitutional:  Negative for fatigue and fever.   HENT:  Negative for ear pain, " "postnasal drip, rhinorrhea and sinus pressure/congestion.    Respiratory:  Negative for cough and shortness of breath.    Cardiovascular:  Negative for chest pain.   Gastrointestinal:  Negative for abdominal pain, diarrhea, nausea and vomiting.   Genitourinary:  Negative for dysuria.   Neurological:  Negative for headaches.        /86 (BP Location: Left arm, Patient Position: Sitting)   Pulse 63   Temp 98.8 °F (37.1 °C) (Oral)   Resp 18   Ht 5' 4" (1.626 m)   Wt 104.8 kg (231 lb)   LMP 02/23/2025   SpO2 100%   BMI 39.65 kg/m²      Physical Exam  HENT:      Head: Normocephalic and atraumatic.   Cardiovascular:      Rate and Rhythm: Normal rate and regular rhythm.   Pulmonary:      Effort: Pulmonary effort is normal.      Breath sounds: Normal breath sounds.   Neurological:      Mental Status: She is alert and oriented to person, place, and time.   Psychiatric:         Mood and Affect: Mood normal.         Behavior: Behavior normal.          Assessment and Plan      ICD-10-CM ICD-9-CM   1. Chronic pain of right knee  M25.561 719.46    G89.29 338.29   2. Hot flashes  R23.2 782.62   3. Bipolar affective disorder, currently depressed, moderate  F31.32 296.52        1. Chronic pain of right knee  Not controlled. 4/40 IM and naproxen as needed for pain.   -     Cancel: X-Ray Knee 3 View Right; Future; Expected date: 04/03/2025  -     methylPREDNISolone acetate injection 40 mg  -     dexAMETHasone injection 4 mg  -     naproxen (NAPROSYN) 500 MG tablet; Take 1 tablet (500 mg total) by mouth 2 (two) times daily as needed (pain).  Dispense: 60 tablet; Refill: 2    2. Hot flashes  Not controlled. Refer to gynecology.   -     Ambulatory referral/consult to Obstetrics / Gynecology; Future; Expected date: 04/10/2025    3. Bipolar affective disorder, currently depressed, moderate  Stable. Continue current meds. Refer for counseling.   -     Ambulatory referral/consult to Psychology; Future; Expected date: " 04/10/2025       Follow up if symptoms worsen or fail to improve.            [1]   Current Outpatient Medications:     amlodipine-valsartan (EXFORGE)  mg per tablet, Take 1 tablet by mouth once daily., Disp: 90 tablet, Rfl: 1    atorvastatin (LIPITOR) 10 MG tablet, Take 1 tablet (10 mg total) by mouth every evening., Disp: 90 tablet, Rfl: 1    b complex vitamins tablet, Take 1 tablet by mouth once daily., Disp: , Rfl:     cariprazine (VRAYLAR) 4.5 mg Cap, Take 1 capsule (4.5 mg total) by mouth once daily., Disp: 30 capsule, Rfl: 5    ferrous sulfate (FEOSOL) 325 mg (65 mg iron) Tab tablet, Take 1 tablet (325 mg total) by mouth 3 (three) times daily., Disp: 270 tablet, Rfl: 1    loratadine (CLARITIN) 10 mg tablet, Take 1 tablet (10 mg total) by mouth once daily., Disp: 30 tablet, Rfl: 2    meclizine (ANTIVERT) 25 mg tablet, Take 1 tablet (25 mg total) by mouth 3 (three) times daily as needed for Dizziness., Disp: 30 tablet, Rfl: 1    potassium chloride (MICRO-K) 10 MEQ CpSR, Take 1 capsule (10 mEq total) by mouth once daily., Disp: 90 capsule, Rfl: 1    naproxen (NAPROSYN) 500 MG tablet, Take 1 tablet (500 mg total) by mouth 2 (two) times daily as needed (pain)., Disp: 60 tablet, Rfl: 2

## 2025-04-30 DIAGNOSIS — R23.2 HOT FLASHES: Primary | ICD-10-CM

## 2025-05-14 ENCOUNTER — OFFICE VISIT (OUTPATIENT)
Dept: FAMILY MEDICINE | Facility: CLINIC | Age: 50
End: 2025-05-14
Payer: COMMERCIAL

## 2025-05-14 VITALS
DIASTOLIC BLOOD PRESSURE: 83 MMHG | SYSTOLIC BLOOD PRESSURE: 130 MMHG | HEIGHT: 64 IN | WEIGHT: 226 LBS | BODY MASS INDEX: 38.58 KG/M2 | RESPIRATION RATE: 18 BRPM | OXYGEN SATURATION: 98 % | HEART RATE: 103 BPM | TEMPERATURE: 100 F

## 2025-05-14 DIAGNOSIS — F31.32 BIPOLAR AFFECTIVE DISORDER, CURRENTLY DEPRESSED, MODERATE: Primary | Chronic | ICD-10-CM

## 2025-05-14 PROCEDURE — 4010F ACE/ARB THERAPY RXD/TAKEN: CPT | Mod: ,,, | Performed by: FAMILY MEDICINE

## 2025-05-14 PROCEDURE — 1160F RVW MEDS BY RX/DR IN RCRD: CPT | Mod: ,,, | Performed by: FAMILY MEDICINE

## 2025-05-14 PROCEDURE — 3008F BODY MASS INDEX DOCD: CPT | Mod: ,,, | Performed by: FAMILY MEDICINE

## 2025-05-14 PROCEDURE — 3079F DIAST BP 80-89 MM HG: CPT | Mod: ,,, | Performed by: FAMILY MEDICINE

## 2025-05-14 PROCEDURE — 3075F SYST BP GE 130 - 139MM HG: CPT | Mod: ,,, | Performed by: FAMILY MEDICINE

## 2025-05-14 PROCEDURE — 99214 OFFICE O/P EST MOD 30 MIN: CPT | Mod: ,,, | Performed by: FAMILY MEDICINE

## 2025-05-14 PROCEDURE — 1159F MED LIST DOCD IN RCRD: CPT | Mod: ,,, | Performed by: FAMILY MEDICINE

## 2025-05-14 RX ORDER — QUETIAPINE FUMARATE 50 MG/1
50 TABLET, FILM COATED ORAL NIGHTLY
Qty: 30 TABLET | Refills: 5 | Status: SHIPPED | OUTPATIENT
Start: 2025-05-14 | End: 2026-05-14

## 2025-05-14 NOTE — PROGRESS NOTES
"New Clinic Note    Yokasta Segal is a 50 y.o. female     CC:   Chief Complaint   Patient presents with    Anxiety     Patient is complaining of three days of increased anxiety and stated she only on the Vraylar. Stated "I need something to calm me down".     Depression    Knee Pain     Chronic knee pain. Rates her pain as 8/10 on pain scale.         Subjective    History of Present Illness HPI   Patient complains of increased depression and anxiety. She says she is having a hard time sleeping. She has been on wellbutrin and seroquel in the past. She said the seroquel helped some but the wellbutrin did not help. Her psychiatry referral is still pending.     Current Medications[1]     Past Medical History:   Diagnosis Date    Anxiety     Arthritis     Depression     Hyperlipidemia     Hypertension         Family History   Problem Relation Name Age of Onset    Cancer Mother      Gout Mother      Thyroid disease Mother      Cancer Father          Past Surgical History:   Procedure Laterality Date    Left Knee Surgery Left     TUBAL LIGATION          Review of Systems   Constitutional:  Negative for fatigue and fever.   HENT:  Negative for ear pain, postnasal drip, rhinorrhea and sinus pressure/congestion.    Respiratory:  Negative for cough and shortness of breath.    Cardiovascular:  Negative for chest pain.   Gastrointestinal:  Negative for abdominal pain, diarrhea, nausea and vomiting.   Genitourinary:  Negative for dysuria.   Neurological:  Negative for headaches.   Psychiatric/Behavioral:  Positive for depressed mood. Negative for hallucinations and suicidal ideas. The patient is nervous/anxious. The patient is not hyperactive.         /83 (BP Location: Left arm, Patient Position: Sitting)   Pulse 103   Temp 100 °F (37.8 °C) (Oral)   Resp 18   Ht 5' 4" (1.626 m)   Wt 102.5 kg (226 lb)   LMP 05/11/2025   SpO2 98%   BMI 38.79 kg/m²      Physical Exam  HENT:      Head: Normocephalic and atraumatic. "   Cardiovascular:      Rate and Rhythm: Normal rate and regular rhythm.   Pulmonary:      Effort: Pulmonary effort is normal.      Breath sounds: Normal breath sounds.   Neurological:      Mental Status: She is alert and oriented to person, place, and time.   Psychiatric:         Mood and Affect: Mood normal.         Behavior: Behavior normal.          Assessment and Plan      ICD-10-CM ICD-9-CM   1. Bipolar affective disorder, currently depressed, moderate  F31.32 296.52        1. Bipolar affective disorder, currently depressed, moderate  Not controlled.  Will add Seroquel.  Return in 1 week for follow-up.  Spoke with referral team.  Patient's insurance does not cover the referral that was sent earlier.  Will re-refer to [facility].  -     QUEtiapine (SEROQUEL) 50 MG tablet; Take 1 tablet (50 mg total) by mouth every evening.  Dispense: 30 tablet; Refill: 5  -     Ambulatory referral/consult to Psychology; Future; Expected date: 05/21/2025    Follow up in about 1 week (around 5/21/2025).            [1]   Current Outpatient Medications:     amlodipine-valsartan (EXFORGE)  mg per tablet, Take 1 tablet by mouth once daily., Disp: 90 tablet, Rfl: 1    atorvastatin (LIPITOR) 10 MG tablet, Take 1 tablet (10 mg total) by mouth every evening., Disp: 90 tablet, Rfl: 1    b complex vitamins tablet, Take 1 tablet by mouth once daily., Disp: , Rfl:     cariprazine (VRAYLAR) 4.5 mg Cap, Take 1 capsule (4.5 mg total) by mouth once daily., Disp: 30 capsule, Rfl: 5    ferrous sulfate (FEOSOL) 325 mg (65 mg iron) Tab tablet, Take 1 tablet (325 mg total) by mouth 3 (three) times daily., Disp: 270 tablet, Rfl: 1    loratadine (CLARITIN) 10 mg tablet, Take 1 tablet (10 mg total) by mouth once daily., Disp: 30 tablet, Rfl: 2    meclizine (ANTIVERT) 25 mg tablet, Take 1 tablet (25 mg total) by mouth 3 (three) times daily as needed for Dizziness., Disp: 30 tablet, Rfl: 1    naproxen (NAPROSYN) 500 MG tablet, Take 1 tablet (500 mg total)  by mouth 2 (two) times daily as needed (pain)., Disp: 60 tablet, Rfl: 2    potassium chloride (MICRO-K) 10 MEQ CpSR, Take 1 capsule (10 mEq total) by mouth once daily., Disp: 90 capsule, Rfl: 1    QUEtiapine (SEROQUEL) 50 MG tablet, Take 1 tablet (50 mg total) by mouth every evening., Disp: 30 tablet, Rfl: 5

## 2025-05-20 ENCOUNTER — TELEPHONE (OUTPATIENT)
Dept: FAMILY MEDICINE | Facility: CLINIC | Age: 50
End: 2025-05-20
Payer: COMMERCIAL

## 2025-05-20 NOTE — TELEPHONE ENCOUNTER
----- Message from Amanda sent at 5/19/2025  4:59 PM CDT -----  MAREN LOBO [[id number]] is not wanting to go to [facility] @ phone: [phone]. She is wanting to go somewhere in [facility]

## 2025-05-23 ENCOUNTER — OFFICE VISIT (OUTPATIENT)
Dept: FAMILY MEDICINE | Facility: CLINIC | Age: 50
End: 2025-05-23
Payer: COMMERCIAL

## 2025-05-23 VITALS
OXYGEN SATURATION: 99 % | DIASTOLIC BLOOD PRESSURE: 70 MMHG | BODY MASS INDEX: 38.76 KG/M2 | WEIGHT: 227 LBS | HEIGHT: 64 IN | TEMPERATURE: 98 F | HEART RATE: 94 BPM | SYSTOLIC BLOOD PRESSURE: 117 MMHG | RESPIRATION RATE: 18 BRPM

## 2025-05-23 DIAGNOSIS — G89.29 CHRONIC PAIN OF RIGHT KNEE: ICD-10-CM

## 2025-05-23 DIAGNOSIS — E78.2 MIXED HYPERLIPIDEMIA: ICD-10-CM

## 2025-05-23 DIAGNOSIS — M25.561 CHRONIC PAIN OF RIGHT KNEE: ICD-10-CM

## 2025-05-23 DIAGNOSIS — F31.32 BIPOLAR AFFECTIVE DISORDER, CURRENTLY DEPRESSED, MODERATE: ICD-10-CM

## 2025-05-23 DIAGNOSIS — F41.0 PANIC ATTACKS: ICD-10-CM

## 2025-05-23 DIAGNOSIS — I10 PRIMARY HYPERTENSION: ICD-10-CM

## 2025-05-23 DIAGNOSIS — F41.0 PANIC ATTACKS: Primary | ICD-10-CM

## 2025-05-23 DIAGNOSIS — D50.8 IRON DEFICIENCY ANEMIA SECONDARY TO INADEQUATE DIETARY IRON INTAKE: ICD-10-CM

## 2025-05-23 DIAGNOSIS — E87.6 HYPOKALEMIA DUE TO EXCESSIVE RENAL LOSS OF POTASSIUM: ICD-10-CM

## 2025-05-23 LAB
ALBUMIN SERPL BCP-MCNC: 3.9 G/DL (ref 3.5–5)
ALBUMIN/GLOB SERPL: 1 {RATIO}
ALP SERPL-CCNC: 54 U/L (ref 40–150)
ALT SERPL W P-5'-P-CCNC: 10 U/L
ANION GAP SERPL CALCULATED.3IONS-SCNC: 14 MMOL/L (ref 7–16)
AST SERPL W P-5'-P-CCNC: 17 U/L (ref 11–45)
BASOPHILS # BLD AUTO: 0.11 K/UL (ref 0–0.2)
BASOPHILS NFR BLD AUTO: 1.6 % (ref 0–1)
BILIRUB SERPL-MCNC: 0.3 MG/DL
BUN SERPL-MCNC: 5 MG/DL (ref 10–20)
BUN/CREAT SERPL: 6 (ref 6–20)
CALCIUM SERPL-MCNC: 9.7 MG/DL (ref 8.4–10.2)
CHLORIDE SERPL-SCNC: 107 MMOL/L (ref 98–107)
CHOLEST SERPL-MCNC: 144 MG/DL
CHOLEST/HDLC SERPL: 2.8 {RATIO}
CO2 SERPL-SCNC: 25 MMOL/L (ref 22–29)
CREAT SERPL-MCNC: 0.9 MG/DL (ref 0.55–1.02)
DIFFERENTIAL METHOD BLD: ABNORMAL
EGFR (NO RACE VARIABLE) (RUSH/TITUS): 77 ML/MIN/1.73M2
EOSINOPHIL # BLD AUTO: 0.04 K/UL (ref 0–0.5)
EOSINOPHIL NFR BLD AUTO: 0.6 % (ref 1–4)
ERYTHROCYTE [DISTWIDTH] IN BLOOD BY AUTOMATED COUNT: 15.8 % (ref 11.5–14.5)
GLOBULIN SER-MCNC: 3.8 G/DL (ref 2–4)
GLUCOSE SERPL-MCNC: 107 MG/DL (ref 74–100)
HCT VFR BLD AUTO: 45.6 % (ref 38–47)
HDLC SERPL-MCNC: 51 MG/DL (ref 35–60)
HGB BLD-MCNC: 13.7 G/DL (ref 12–16)
IMM GRANULOCYTES # BLD AUTO: 0.01 K/UL (ref 0–0.04)
IMM GRANULOCYTES NFR BLD: 0.1 % (ref 0–0.4)
LDLC SERPL CALC-MCNC: 79 MG/DL
LDLC/HDLC SERPL: 1.5 {RATIO}
LYMPHOCYTES # BLD AUTO: 1.56 K/UL (ref 1–4.8)
LYMPHOCYTES NFR BLD AUTO: 23 % (ref 27–41)
MCH RBC QN AUTO: 29 PG (ref 27–31)
MCHC RBC AUTO-ENTMCNC: 30 G/DL (ref 32–36)
MCV RBC AUTO: 96.4 FL (ref 80–96)
MONOCYTES # BLD AUTO: 0.59 K/UL (ref 0–0.8)
MONOCYTES NFR BLD AUTO: 8.7 % (ref 2–6)
MPC BLD CALC-MCNC: 11 FL (ref 9.4–12.4)
NEUTROPHILS # BLD AUTO: 4.48 K/UL (ref 1.8–7.7)
NEUTROPHILS NFR BLD AUTO: 66 % (ref 53–65)
NONHDLC SERPL-MCNC: 93 MG/DL
NRBC # BLD AUTO: 0 X10E3/UL
NRBC, AUTO (.00): 0 %
PLATELET # BLD AUTO: 392 K/UL (ref 150–400)
POTASSIUM SERPL-SCNC: 3.8 MMOL/L (ref 3.5–5.1)
PROT SERPL-MCNC: 7.7 G/DL (ref 6.4–8.3)
RBC # BLD AUTO: 4.73 M/UL (ref 4.2–5.4)
SODIUM SERPL-SCNC: 142 MMOL/L (ref 136–145)
TRIGL SERPL-MCNC: 70 MG/DL (ref 37–140)
VLDLC SERPL-MCNC: 14 MG/DL
WBC # BLD AUTO: 6.79 K/UL (ref 4.5–11)

## 2025-05-23 PROCEDURE — 1159F MED LIST DOCD IN RCRD: CPT | Mod: ,,, | Performed by: FAMILY MEDICINE

## 2025-05-23 PROCEDURE — 80061 LIPID PANEL: CPT | Mod: ,,, | Performed by: CLINICAL MEDICAL LABORATORY

## 2025-05-23 PROCEDURE — 3008F BODY MASS INDEX DOCD: CPT | Mod: ,,, | Performed by: FAMILY MEDICINE

## 2025-05-23 PROCEDURE — 85025 COMPLETE CBC W/AUTO DIFF WBC: CPT | Mod: ,,, | Performed by: CLINICAL MEDICAL LABORATORY

## 2025-05-23 PROCEDURE — 80053 COMPREHEN METABOLIC PANEL: CPT | Mod: ,,, | Performed by: CLINICAL MEDICAL LABORATORY

## 2025-05-23 PROCEDURE — 99214 OFFICE O/P EST MOD 30 MIN: CPT | Mod: ,,, | Performed by: FAMILY MEDICINE

## 2025-05-23 PROCEDURE — 3078F DIAST BP <80 MM HG: CPT | Mod: ,,, | Performed by: FAMILY MEDICINE

## 2025-05-23 PROCEDURE — 1160F RVW MEDS BY RX/DR IN RCRD: CPT | Mod: ,,, | Performed by: FAMILY MEDICINE

## 2025-05-23 PROCEDURE — 4010F ACE/ARB THERAPY RXD/TAKEN: CPT | Mod: ,,, | Performed by: FAMILY MEDICINE

## 2025-05-23 PROCEDURE — 3074F SYST BP LT 130 MM HG: CPT | Mod: ,,, | Performed by: FAMILY MEDICINE

## 2025-05-23 RX ORDER — FERROUS SULFATE 325(65) MG
325 TABLET ORAL 3 TIMES DAILY
Qty: 270 TABLET | Refills: 1 | Status: SHIPPED | OUTPATIENT
Start: 2025-05-23

## 2025-05-23 RX ORDER — POTASSIUM CHLORIDE 750 MG/1
10 CAPSULE, EXTENDED RELEASE ORAL DAILY
Qty: 90 CAPSULE | Refills: 1 | Status: SHIPPED | OUTPATIENT
Start: 2025-05-23

## 2025-05-23 RX ORDER — NAPROXEN 500 MG/1
500 TABLET ORAL 2 TIMES DAILY PRN
Qty: 60 TABLET | Refills: 2 | Status: SHIPPED | OUTPATIENT
Start: 2025-05-23

## 2025-05-23 RX ORDER — CARIPRAZINE 4.5 MG/1
4.5 CAPSULE, GELATIN COATED ORAL DAILY
Qty: 30 CAPSULE | Refills: 5 | Status: SHIPPED | OUTPATIENT
Start: 2025-05-23

## 2025-05-23 RX ORDER — AMLODIPINE AND VALSARTAN 10; 320 MG/1; MG/1
1 TABLET ORAL DAILY
Qty: 90 TABLET | Refills: 1 | Status: SHIPPED | OUTPATIENT
Start: 2025-05-23

## 2025-05-23 RX ORDER — ATORVASTATIN CALCIUM 10 MG/1
10 TABLET, FILM COATED ORAL NIGHTLY
Qty: 90 TABLET | Refills: 1 | Status: SHIPPED | OUTPATIENT
Start: 2025-05-23

## 2025-05-23 RX ORDER — HYDROXYZINE PAMOATE 25 MG/1
CAPSULE ORAL
Qty: 60 CAPSULE | Refills: 2 | Status: SHIPPED | OUTPATIENT
Start: 2025-05-23

## 2025-05-23 RX ORDER — HYDROXYZINE PAMOATE 25 MG/1
25 CAPSULE ORAL 4 TIMES DAILY
Qty: 60 CAPSULE | Refills: 1 | Status: SHIPPED | OUTPATIENT
Start: 2025-05-23 | End: 2025-05-23

## 2025-05-23 NOTE — PROGRESS NOTES
New Clinic Note    Yokasta Segal is a 50 y.o. female     CC:   Chief Complaint   Patient presents with    Anxiety     Patient complains of worsening anxiety. She was started on Seroquel  at  to help her rest. Has been on Wellbutrin in the past but stated it did not help her.  Her Psychiatry referral with [facility] has been set up in [facility].     Depression    Follow-up        Subjective    History of Present Illness HPI   Patient complains of anxiety.  She has been treated with Seroquel and Vraylar for her bipolar.  She reports that the Seroquel has helped her sleep but has not helped her anxiety.  Patient says she is anxious because both of her sons have been arrested recently.  They have to pay fines.  She is concerned that they will go to assisted.  She said that this is making her very anxious.  She denies wanting to hurt herself.  She is afraid that she may have a breakdown.  She has an appointment with Psychiatry at [facility] in 2 weeks.  Patient needs her chronic medications refilled.  Current Medications[1]     Past Medical History:   Diagnosis Date    Anxiety     Arthritis     Depression     Hyperlipidemia     Hypertension         Family History   Problem Relation Name Age of Onset    Cancer Mother      Gout Mother      Thyroid disease Mother      Cancer Father          Past Surgical History:   Procedure Laterality Date    Left Knee Surgery Left     TUBAL LIGATION          Review of Systems   Constitutional:  Negative for fatigue and fever.   HENT:  Negative for ear pain, postnasal drip, rhinorrhea and sinus pressure/congestion.    Respiratory:  Negative for cough and shortness of breath.    Cardiovascular:  Negative for chest pain.   Gastrointestinal:  Negative for abdominal pain, diarrhea, nausea and vomiting.   Genitourinary:  Negative for dysuria.   Neurological:  Negative for headaches.   Psychiatric/Behavioral:  Negative for self-injury and suicidal ideas. The patient is nervous/anxious.         BP  "117/70 (BP Location: Left arm, Patient Position: Sitting)   Pulse 94   Temp 97.9 °F (36.6 °C) (Oral)   Resp 18   Ht 5' 4" (1.626 m)   Wt 103 kg (227 lb)   LMP 05/11/2025   SpO2 99%   BMI 38.96 kg/m²      Physical Exam  HENT:      Head: Normocephalic and atraumatic.   Cardiovascular:      Rate and Rhythm: Normal rate and regular rhythm.   Pulmonary:      Effort: Pulmonary effort is normal.      Breath sounds: Normal breath sounds.   Neurological:      Mental Status: She is alert and oriented to person, place, and time.   Psychiatric:         Mood and Affect: Mood normal.         Behavior: Behavior normal.          Assessment and Plan      ICD-10-CM ICD-9-CM   1. Panic attacks  F41.0 300.01   2. Mixed hyperlipidemia  E78.2 272.2   3. Primary hypertension  I10 401.9   4. Bipolar affective disorder, currently depressed, moderate  F31.32 296.52   5. Iron deficiency anemia secondary to inadequate dietary iron intake  D50.8 280.1   6. Chronic pain of right knee  M25.561 719.46    G89.29 338.29   7. Hypokalemia due to excessive renal loss of potassium  E87.6 276.8        1. Panic attacks  Not controlled.  Will add hydroxyzine as needed for panic attacks.  Follow-up with psychiatry at [place].  Explained to patient it she got worse over the holiday weekend she could go to [place] to be evaluated.  Number and address were given to the patient  -     Discontinue: hydrOXYzine pamoate (VISTARIL) 25 MG Cap; Take 1 capsule (25 mg total) by mouth 4 (four) times daily.  Dispense: 60 capsule; Refill: 1    2. Mixed hyperlipidemia  The current medical regimen is effective;  continue present plan and medications.  -     Lipid Panel; Future; Expected date: 05/23/2025  -     Comprehensive Metabolic Panel; Future; Expected date: 05/23/2025  -     atorvastatin (LIPITOR) 10 MG tablet; Take 1 tablet (10 mg total) by mouth every evening.  Dispense: 90 tablet; Refill: 1    3. Primary hypertension  The current medical regimen is " effective;  continue present plan and medications.  -     Lipid Panel; Future; Expected date: 05/23/2025  -     Comprehensive Metabolic Panel; Future; Expected date: 05/23/2025  -     CBC Auto Differential; Future; Expected date: 05/23/2025  -     amlodipine-valsartan (EXFORGE)  mg per tablet; Take 1 tablet by mouth once daily.  Dispense: 90 tablet; Refill: 1    4. Bipolar affective disorder, currently depressed, moderate  Not controlled.  Continue current medicines.  Follow-up with psychiatry.              cariprazine (VRAYLAR) 4.5 mg Cap; Take 1 capsule (4.5 mg total) by mouth once daily.  Dispense: 30 capsule; Refill: 5    5. Iron deficiency anemia secondary to inadequate dietary iron intake  The current medical regimen is effective;  continue present plan and medications.  -     ferrous sulfate (FEOSOL) 325 mg (65 mg iron) Tab tablet; Take 1 tablet (325 mg total) by mouth 3 (three) times daily.  Dispense: 270 tablet; Refill: 1    6. Chronic pain of right knee  The current medical regimen is effective;  continue present plan and medications.  -     naproxen (NAPROSYN) 500 MG tablet; Take 1 tablet (500 mg total) by mouth 2 (two) times daily as needed (pain).  Dispense: 60 tablet; Refill: 2    7. Hypokalemia due to excessive renal loss of potassium  The current medical regimen is effective;  continue present plan and medications.  -     potassium chloride (MICRO-K) 10 MEQ CpSR; Take 1 capsule (10 mEq total) by mouth once daily.  Dispense: 90 capsule; Refill: 1         Follow up in about 2 weeks (around 6/6/2025).            [1]   Current Outpatient Medications:     b complex vitamins tablet, Take 1 tablet by mouth once daily., Disp: , Rfl:     meclizine (ANTIVERT) 25 mg tablet, Take 1 tablet (25 mg total) by mouth 3 (three) times daily as needed for Dizziness., Disp: 30 tablet, Rfl: 1    QUEtiapine (SEROQUEL) 50 MG tablet, Take 1 tablet (50 mg total) by mouth every evening., Disp: 30 tablet, Rfl: 5     amlodipine-valsartan (EXFORGE)  mg per tablet, Take 1 tablet by mouth once daily., Disp: 90 tablet, Rfl: 1    atorvastatin (LIPITOR) 10 MG tablet, Take 1 tablet (10 mg total) by mouth every evening., Disp: 90 tablet, Rfl: 1    cariprazine (VRAYLAR) 4.5 mg Cap, Take 1 capsule (4.5 mg total) by mouth once daily., Disp: 30 capsule, Rfl: 5    ferrous sulfate (FEOSOL) 325 mg (65 mg iron) Tab tablet, Take 1 tablet (325 mg total) by mouth 3 (three) times daily., Disp: 270 tablet, Rfl: 1    hydrOXYzine pamoate (VISTARIL) 25 MG Cap, Take 1-2 caps by mouth every six hours as needed, Disp: 60 capsule, Rfl: 2    naproxen (NAPROSYN) 500 MG tablet, Take 1 tablet (500 mg total) by mouth 2 (two) times daily as needed (pain)., Disp: 60 tablet, Rfl: 2    potassium chloride (MICRO-K) 10 MEQ CpSR, Take 1 capsule (10 mEq total) by mouth once daily., Disp: 90 capsule, Rfl: 1

## 2025-05-29 ENCOUNTER — RESULTS FOLLOW-UP (OUTPATIENT)
Dept: FAMILY MEDICINE | Facility: CLINIC | Age: 50
End: 2025-05-29

## 2025-07-31 ENCOUNTER — EXTERNAL CHRONIC CARE MANAGEMENT (OUTPATIENT)
Dept: FAMILY MEDICINE | Facility: CLINIC | Age: 50
End: 2025-07-31
Payer: COMMERCIAL

## 2025-07-31 PROCEDURE — 99490 CHRNC CARE MGMT STAFF 1ST 20: CPT | Mod: ,,, | Performed by: FAMILY MEDICINE

## 2025-07-31 PROCEDURE — 99439 CHRNC CARE MGMT STAF EA ADDL: CPT | Mod: ,,, | Performed by: FAMILY MEDICINE

## 2025-08-05 ENCOUNTER — OFFICE VISIT (OUTPATIENT)
Dept: OBSTETRICS AND GYNECOLOGY | Facility: CLINIC | Age: 50
End: 2025-08-05
Payer: COMMERCIAL

## 2025-08-05 VITALS
SYSTOLIC BLOOD PRESSURE: 121 MMHG | TEMPERATURE: 98 F | HEART RATE: 83 BPM | DIASTOLIC BLOOD PRESSURE: 85 MMHG | WEIGHT: 215 LBS | BODY MASS INDEX: 36.7 KG/M2 | HEIGHT: 64 IN | RESPIRATION RATE: 18 BRPM | OXYGEN SATURATION: 100 %

## 2025-08-05 DIAGNOSIS — E66.9 OBESITY (BMI 30-39.9): ICD-10-CM

## 2025-08-05 DIAGNOSIS — Z12.4 ENCOUNTER FOR PAPANICOLAOU SMEAR FOR CERVICAL CANCER SCREENING: ICD-10-CM

## 2025-08-05 DIAGNOSIS — R23.2 HOT FLASHES: ICD-10-CM

## 2025-08-05 DIAGNOSIS — Z01.419 WOMEN'S ANNUAL ROUTINE GYNECOLOGICAL EXAMINATION: Primary | ICD-10-CM

## 2025-08-05 DIAGNOSIS — Z11.51 SCREENING FOR HPV (HUMAN PAPILLOMAVIRUS): ICD-10-CM

## 2025-08-05 PROCEDURE — 3074F SYST BP LT 130 MM HG: CPT | Mod: ,,, | Performed by: ADVANCED PRACTICE MIDWIFE

## 2025-08-05 PROCEDURE — 3079F DIAST BP 80-89 MM HG: CPT | Mod: ,,, | Performed by: ADVANCED PRACTICE MIDWIFE

## 2025-08-05 PROCEDURE — 1159F MED LIST DOCD IN RCRD: CPT | Mod: ,,, | Performed by: ADVANCED PRACTICE MIDWIFE

## 2025-08-05 PROCEDURE — 88142 CYTOPATH C/V THIN LAYER: CPT | Mod: TC,GCY | Performed by: ADVANCED PRACTICE MIDWIFE

## 2025-08-05 PROCEDURE — 3008F BODY MASS INDEX DOCD: CPT | Mod: ,,, | Performed by: ADVANCED PRACTICE MIDWIFE

## 2025-08-05 PROCEDURE — 87626 HPV SEP HI-RSK TYP&POOL RSLT: CPT | Mod: ,,, | Performed by: CLINICAL MEDICAL LABORATORY

## 2025-08-05 PROCEDURE — 99396 PREV VISIT EST AGE 40-64: CPT | Mod: ,,, | Performed by: ADVANCED PRACTICE MIDWIFE

## 2025-08-05 PROCEDURE — 4010F ACE/ARB THERAPY RXD/TAKEN: CPT | Mod: ,,, | Performed by: ADVANCED PRACTICE MIDWIFE

## 2025-08-05 RX ORDER — FLUOXETINE HYDROCHLORIDE 40 MG/1
40 CAPSULE ORAL DAILY
COMMUNITY
Start: 2025-08-04

## 2025-08-05 RX ORDER — TRAZODONE HYDROCHLORIDE 100 MG/1
100 TABLET ORAL NIGHTLY
COMMUNITY
Start: 2025-07-21

## 2025-08-05 RX ORDER — BUSPIRONE HYDROCHLORIDE 10 MG/1
10 TABLET ORAL 2 TIMES DAILY
COMMUNITY
Start: 2025-08-04

## 2025-08-05 NOTE — PROGRESS NOTES
"  Patient ID: Yokasta Segal is a 50 y.o. female     Chief Complaint:   Chief Complaint   Patient presents with    Annual Exam     Patient is here for her pap smear today.     Health Maintenance     Patient states she is scheduled to have a mammogram in October at [facility] in [place].       HPI: Yokasta Segal is a 50 y.o. female who presents as a new patient for well woman exam.  C/O occasional hot flashes. Admitted for psychiatric care in May 2025, started meds. Discussed perimenopause and menopause. Discussed management of hot flashes including HRT, lifestyle changes. Desires to try lifestyle changes, will follow up as needed.   LMP: Patient's last menstrual period was 2025 (approximate). Monthly lasting 5 days. Heavy to normal flow  Sexually active: yes, , declines STD    Contraceptive: BTL  Mammogram: 24, benign findings   23 negative, repeat in 10 years     Past Medical History:   Diagnosis Date    Anxiety     Arthritis     Bipolar affective disorder     Depression     Hyperlipidemia     Hypertension        Past Surgical History:   Procedure Laterality Date    Left Knee Surgery Left     TUBAL LIGATION         Social History[1]    Family History   Problem Relation Name Age of Onset    Cancer Mother      Gout Mother      Thyroid disease Mother      Cancer Father         OB History          3    Para        Term                AB        Living             SAB        IAB        Ectopic        Multiple        Live Births   3                 /85 (BP Location: Left arm, Patient Position: Sitting)   Pulse 83   Temp 97.8 °F (36.6 °C) (Oral)   Resp 18   Ht 5' 4" (1.626 m)   Wt 97.5 kg (215 lb)   LMP 2025 (Approximate)   SpO2 100%   BMI 36.90 kg/m²     Wt Readings from Last 3 Encounters:   25 97.5 kg (215 lb)   25 103 kg (227 lb)   25 102.5 kg (226 lb)        Review of Systems   Constitutional: Negative.    Eyes: Negative.    Respiratory: Negative.   "   Cardiovascular: Negative.    Gastrointestinal: Negative.    Genitourinary: Negative.  Positive for hot flashes.   Musculoskeletal: Negative.    Integumentary:  Negative.   Neurological: Negative.    Hematological: Negative.    Psychiatric/Behavioral: Negative.     Breast: negative.         Physical Exam   GENERAL: Well-developed, well-nourished obese female in no acute distress  NECK: Supple with full range of motion. No adenopathy, masses, or thyromegaly  SKIN: Normal to inspection with no rashes, lesions, or ulcers  LUNGS: Clear bilaterally with no rales, rhonchi, or wheezes   CARDIOVASCULAR AUSCULTATION: Normal heart rate and rhythm  BREASTS: No masses, lumps, discharge, tenderness, or skin changes  LYMPH NODES: No axillary, or inguinal adenopathy  ABDOMEN: Soft, non tender, without masses. No palpable hernia or  hepatosplenomegaly  VULVA: General appearance normal; external genitalia without lesions or rash  URETHRA: Normal size and location with no lesions or prolapse  VAGINA: Mucosa normal, no discharge or lesions  CERVIX: Pink without lesions, discharge or tenderness  UTERUS: Regular, mobile, and non tender;  consistency is normal. No evidence of adnexa masses, tenderness, or nodularity  ANUS: No lesions or relaxation.  LOWER EXTREMITIES: No edema or tenderness  PSYCHIATRIC: Patient is oriented to person, place, and time. Mood and affect are normal      Assessment:  Women's annual routine gynecological examination  -     ThinPrep Pap Test; Future; Expected date: 08/05/2025    Hot flashes  -     Ambulatory referral/consult to Gynecology    Encounter for Papanicolaou smear for cervical cancer screening  -     ThinPrep Pap Test; Future; Expected date: 08/05/2025    Screening for HPV (human papillomavirus)  -     ThinPrep Pap Test; Future; Expected date: 08/05/2025    Obesity (BMI 30-39.9)          ICD-10-CM ICD-9-CM    1. Women's annual routine gynecological examination  Z01.419 V72.31 ThinPrep Pap Test       ThinPrep Pap Test      2. Hot flashes  R23.2 782.62 Ambulatory referral/consult to Gynecology      3. Encounter for Papanicolaou smear for cervical cancer screening  Z12.4 V76.2 ThinPrep Pap Test      ThinPrep Pap Test      4. Screening for HPV (human papillomavirus)  Z11.51 V73.81 ThinPrep Pap Test      ThinPrep Pap Test      5. Obesity (BMI 30-39.9)  E66.9 278.00           Plan:  Annual exam completed. Pap specimen collected  Will call or send My Chart message after results reviewed  Patient was counseled today on ASCCP Pap with HPV screening guidelines and recommendations for yearly pelvic exams   Encouraged healthy dietary choices, increasing water intake, and exercising at least 3 x weekly  Discussed perimenopause and menopause including management of hot flashes  Encouraged to follow up if desires medication for symptom management    Follow up in about 1 year (around 8/5/2026) for annual gyn exam .                         [1]   Social History  Socioeconomic History    Marital status: Single    Number of children: 3   Tobacco Use    Smoking status: Never     Passive exposure: Never    Smokeless tobacco: Never   Substance and Sexual Activity    Alcohol use: Never    Drug use: Never    Sexual activity: Yes     Partners: Male     Social Drivers of Health     Financial Resource Strain: Medium Risk (2/24/2025)    Overall Financial Resource Strain (CARDIA)     Difficulty of Paying Living Expenses: Somewhat hard   Food Insecurity: Food Insecurity Present (2/24/2025)    Hunger Vital Sign     Worried About Running Out of Food in the Last Year: Sometimes true     Ran Out of Food in the Last Year: Sometimes true   Transportation Needs: No Transportation Needs (2/24/2025)    PRAPARE - Transportation     Lack of Transportation (Medical): No     Lack of Transportation (Non-Medical): No   Physical Activity: Sufficiently Active (2/24/2025)    Exercise Vital Sign     Days of Exercise per Week: 5 days     Minutes of Exercise per  Session: 30 min   Stress: Stress Concern Present (2/24/2025)    [employer] - Occupational Stress Questionnaire     Feeling of Stress : Rather much   Housing Stability: Low Risk  (2/24/2025)    Housing Stability Vital Sign     Unable to Pay for Housing in the Last Year: No     Number of Times Moved in the Last Year: 0     Homeless in the Last Year: No

## 2025-08-07 LAB
GH SERPL-MCNC: NORMAL NG/ML
INSULIN SERPL-ACNC: NORMAL U[IU]/ML
LAB AP CLINICAL INFORMATION: NORMAL
LAB AP GYN INTERPRETATION: NEGATIVE
LAB AP PAP DISCLAIMER COMMENTS: NORMAL
RENIN PLAS-CCNC: NORMAL NG/ML/H

## 2025-08-25 ENCOUNTER — PATIENT MESSAGE (OUTPATIENT)
Dept: OBSTETRICS AND GYNECOLOGY | Facility: CLINIC | Age: 50
End: 2025-08-25
Payer: COMMERCIAL